# Patient Record
Sex: FEMALE | Race: WHITE | NOT HISPANIC OR LATINO | Employment: OTHER | URBAN - METROPOLITAN AREA
[De-identification: names, ages, dates, MRNs, and addresses within clinical notes are randomized per-mention and may not be internally consistent; named-entity substitution may affect disease eponyms.]

---

## 2017-06-07 ENCOUNTER — APPOINTMENT (OUTPATIENT)
Dept: GENERAL RADIOLOGY | Facility: HOSPITAL | Age: 72
End: 2017-06-07

## 2017-06-07 ENCOUNTER — OFFICE VISIT (OUTPATIENT)
Dept: CARDIOLOGY | Facility: CLINIC | Age: 72
End: 2017-06-07

## 2017-06-07 ENCOUNTER — HOSPITAL ENCOUNTER (INPATIENT)
Facility: HOSPITAL | Age: 72
LOS: 2 days | Discharge: HOME | End: 2017-06-11
Attending: INTERNAL MEDICINE | Admitting: INTERNAL MEDICINE

## 2017-06-07 VITALS
HEIGHT: 63 IN | HEART RATE: 90 BPM | DIASTOLIC BLOOD PRESSURE: 88 MMHG | BODY MASS INDEX: 26.93 KG/M2 | WEIGHT: 152 LBS | SYSTOLIC BLOOD PRESSURE: 158 MMHG

## 2017-06-07 DIAGNOSIS — I20.0 UNSTABLE ANGINA (HCC): ICD-10-CM

## 2017-06-07 DIAGNOSIS — I10 ESSENTIAL HYPERTENSION: ICD-10-CM

## 2017-06-07 DIAGNOSIS — I95.9 HYPOTENSION, UNSPECIFIED HYPOTENSION TYPE: Primary | ICD-10-CM

## 2017-06-07 DIAGNOSIS — I07.1 TRICUSPID VALVE INSUFFICIENCY, UNSPECIFIED ETIOLOGY: ICD-10-CM

## 2017-06-07 DIAGNOSIS — D64.9 ANEMIA, UNSPECIFIED TYPE: ICD-10-CM

## 2017-06-07 DIAGNOSIS — Z98.890 HISTORY OF HEART VALVE REPAIR: Primary | ICD-10-CM

## 2017-06-07 LAB
ALBUMIN SERPL-MCNC: 4.4 G/DL (ref 3.5–5.2)
ALBUMIN/GLOB SERPL: 1.4 G/DL
ALP SERPL-CCNC: 92 U/L (ref 39–117)
ALT SERPL W P-5'-P-CCNC: 16 U/L (ref 1–33)
ANION GAP SERPL CALCULATED.3IONS-SCNC: 16.9 MMOL/L
AST SERPL-CCNC: 24 U/L (ref 1–32)
BASOPHILS # BLD AUTO: 0.07 10*3/MM3 (ref 0–0.2)
BASOPHILS NFR BLD AUTO: 1.1 % (ref 0–1.5)
BILIRUB SERPL-MCNC: 0.3 MG/DL (ref 0.1–1.2)
BUN BLD-MCNC: 21 MG/DL (ref 8–23)
BUN/CREAT SERPL: 24.7 (ref 7–25)
CALCIUM SPEC-SCNC: 9.9 MG/DL (ref 8.6–10.5)
CHLORIDE SERPL-SCNC: 103 MMOL/L (ref 98–107)
CHOLEST SERPL-MCNC: 218 MG/DL (ref 0–200)
CO2 SERPL-SCNC: 20.1 MMOL/L (ref 22–29)
CREAT BLD-MCNC: 0.85 MG/DL (ref 0.57–1)
DEPRECATED RDW RBC AUTO: 46.5 FL (ref 37–54)
EOSINOPHIL # BLD AUTO: 0.2 10*3/MM3 (ref 0–0.7)
EOSINOPHIL NFR BLD AUTO: 3.1 % (ref 0.3–6.2)
ERYTHROCYTE [DISTWIDTH] IN BLOOD BY AUTOMATED COUNT: 16 % (ref 11.7–13)
GFR SERPL CREATININE-BSD FRML MDRD: 66 ML/MIN/1.73
GLOBULIN UR ELPH-MCNC: 3.1 GM/DL
GLUCOSE BLD-MCNC: 116 MG/DL (ref 65–99)
HCT VFR BLD AUTO: 36.6 % (ref 35.6–45.5)
HDLC SERPL-MCNC: 90 MG/DL (ref 40–60)
HGB BLD-MCNC: 10.9 G/DL (ref 11.9–15.5)
IMM GRANULOCYTES # BLD: 0.02 10*3/MM3 (ref 0–0.03)
IMM GRANULOCYTES NFR BLD: 0.3 % (ref 0–0.5)
INR PPP: 0.96 (ref 0.9–1.1)
LDLC SERPL CALC-MCNC: 101 MG/DL (ref 0–100)
LDLC/HDLC SERPL: 1.12 {RATIO}
LYMPHOCYTES # BLD AUTO: 1.54 10*3/MM3 (ref 0.9–4.8)
LYMPHOCYTES NFR BLD AUTO: 23.6 % (ref 19.6–45.3)
MAGNESIUM SERPL-MCNC: 2.4 MG/DL (ref 1.6–2.4)
MCH RBC QN AUTO: 23.6 PG (ref 26.9–32)
MCHC RBC AUTO-ENTMCNC: 29.8 G/DL (ref 32.4–36.3)
MCV RBC AUTO: 79.4 FL (ref 80.5–98.2)
MONOCYTES # BLD AUTO: 0.58 10*3/MM3 (ref 0.2–1.2)
MONOCYTES NFR BLD AUTO: 8.9 % (ref 5–12)
NEUTROPHILS # BLD AUTO: 4.12 10*3/MM3 (ref 1.9–8.1)
NEUTROPHILS NFR BLD AUTO: 63 % (ref 42.7–76)
NT-PROBNP SERPL-MCNC: 912.8 PG/ML (ref 0–900)
PLATELET # BLD AUTO: 368 10*3/MM3 (ref 140–500)
PMV BLD AUTO: 9.5 FL (ref 6–12)
POTASSIUM BLD-SCNC: 4.2 MMOL/L (ref 3.5–5.2)
PROT SERPL-MCNC: 7.5 G/DL (ref 6–8.5)
PROTHROMBIN TIME: 12.3 SECONDS (ref 11.7–14.2)
RBC # BLD AUTO: 4.61 10*6/MM3 (ref 3.9–5.2)
SODIUM BLD-SCNC: 140 MMOL/L (ref 136–145)
TRIGL SERPL-MCNC: 137 MG/DL (ref 0–150)
TROPONIN T SERPL-MCNC: <0.01 NG/ML (ref 0–0.03)
TROPONIN T SERPL-MCNC: <0.01 NG/ML (ref 0–0.03)
VLDLC SERPL-MCNC: 27.4 MG/DL (ref 5–40)
WBC NRBC COR # BLD: 6.53 10*3/MM3 (ref 4.5–10.7)

## 2017-06-07 PROCEDURE — 93000 ELECTROCARDIOGRAM COMPLETE: CPT | Performed by: INTERNAL MEDICINE

## 2017-06-07 PROCEDURE — 25010000002 ENOXAPARIN PER 10 MG: Performed by: NURSE PRACTITIONER

## 2017-06-07 PROCEDURE — 93005 ELECTROCARDIOGRAM TRACING: CPT | Performed by: NURSE PRACTITIONER

## 2017-06-07 PROCEDURE — 83735 ASSAY OF MAGNESIUM: CPT | Performed by: NURSE PRACTITIONER

## 2017-06-07 PROCEDURE — 83880 ASSAY OF NATRIURETIC PEPTIDE: CPT | Performed by: NURSE PRACTITIONER

## 2017-06-07 PROCEDURE — 71010 HC CHEST PA OR AP: CPT

## 2017-06-07 PROCEDURE — 84484 ASSAY OF TROPONIN QUANT: CPT | Performed by: NURSE PRACTITIONER

## 2017-06-07 PROCEDURE — 99214 OFFICE O/P EST MOD 30 MIN: CPT | Performed by: INTERNAL MEDICINE

## 2017-06-07 PROCEDURE — 93010 ELECTROCARDIOGRAM REPORT: CPT | Performed by: INTERNAL MEDICINE

## 2017-06-07 PROCEDURE — 85025 COMPLETE CBC W/AUTO DIFF WBC: CPT | Performed by: NURSE PRACTITIONER

## 2017-06-07 PROCEDURE — 85610 PROTHROMBIN TIME: CPT | Performed by: NURSE PRACTITIONER

## 2017-06-07 PROCEDURE — 80061 LIPID PANEL: CPT | Performed by: NURSE PRACTITIONER

## 2017-06-07 PROCEDURE — G0378 HOSPITAL OBSERVATION PER HR: HCPCS

## 2017-06-07 PROCEDURE — 80053 COMPREHEN METABOLIC PANEL: CPT | Performed by: NURSE PRACTITIONER

## 2017-06-07 RX ORDER — CYCLOBENZAPRINE HCL 10 MG
10 TABLET ORAL 3 TIMES DAILY PRN
Status: DISCONTINUED | OUTPATIENT
Start: 2017-06-07 | End: 2017-06-11 | Stop reason: HOSPADM

## 2017-06-07 RX ORDER — LEVOTHYROXINE SODIUM 112 UG/1
112 TABLET ORAL
Status: DISCONTINUED | OUTPATIENT
Start: 2017-06-08 | End: 2017-06-08

## 2017-06-07 RX ORDER — POTASSIUM CHLORIDE 1.5 G/1.77G
20 POWDER, FOR SOLUTION ORAL 2 TIMES DAILY
Status: DISCONTINUED | OUTPATIENT
Start: 2017-06-07 | End: 2017-06-07 | Stop reason: CLARIF

## 2017-06-07 RX ORDER — MELOXICAM 15 MG/1
15 TABLET ORAL DAILY PRN
Status: DISCONTINUED | OUTPATIENT
Start: 2017-06-07 | End: 2017-06-11 | Stop reason: HOSPADM

## 2017-06-07 RX ORDER — ACETAMINOPHEN 325 MG/1
650 TABLET ORAL EVERY 4 HOURS PRN
Status: CANCELLED | OUTPATIENT
Start: 2017-06-07

## 2017-06-07 RX ORDER — ACETAMINOPHEN AND CODEINE PHOSPHATE 300; 30 MG/1; MG/1
1 TABLET ORAL EVERY 6 HOURS PRN
Status: DISCONTINUED | OUTPATIENT
Start: 2017-06-07 | End: 2017-06-11 | Stop reason: HOSPADM

## 2017-06-07 RX ORDER — ACETAMINOPHEN 325 MG/1
650 TABLET ORAL EVERY 4 HOURS PRN
Status: DISCONTINUED | OUTPATIENT
Start: 2017-06-07 | End: 2017-06-11 | Stop reason: HOSPADM

## 2017-06-07 RX ORDER — POTASSIUM CHLORIDE 750 MG/1
20 CAPSULE, EXTENDED RELEASE ORAL 2 TIMES DAILY WITH MEALS
Status: DISCONTINUED | OUTPATIENT
Start: 2017-06-07 | End: 2017-06-11 | Stop reason: HOSPADM

## 2017-06-07 RX ORDER — CETIRIZINE HYDROCHLORIDE 10 MG/1
5 TABLET ORAL DAILY
Status: DISCONTINUED | OUTPATIENT
Start: 2017-06-07 | End: 2017-06-07

## 2017-06-07 RX ORDER — PANTOPRAZOLE SODIUM 40 MG/1
40 TABLET, DELAYED RELEASE ORAL
Status: DISCONTINUED | OUTPATIENT
Start: 2017-06-07 | End: 2017-06-11 | Stop reason: HOSPADM

## 2017-06-07 RX ORDER — PANTOPRAZOLE SODIUM 40 MG/1
40 TABLET, DELAYED RELEASE ORAL
Status: CANCELLED | OUTPATIENT
Start: 2017-06-07

## 2017-06-07 RX ORDER — PANTOPRAZOLE SODIUM 40 MG/1
40 TABLET, DELAYED RELEASE ORAL
Status: DISCONTINUED | OUTPATIENT
Start: 2017-06-07 | End: 2017-06-07

## 2017-06-07 RX ORDER — OXYBUTYNIN CHLORIDE 10 MG/1
10 TABLET, EXTENDED RELEASE ORAL DAILY
Status: DISCONTINUED | OUTPATIENT
Start: 2017-06-07 | End: 2017-06-07

## 2017-06-07 RX ORDER — POTASSIUM CHLORIDE 1.5 G/1.77G
20 POWDER, FOR SOLUTION ORAL 2 TIMES DAILY
COMMUNITY

## 2017-06-07 RX ORDER — ASPIRIN 81 MG/1
81 TABLET ORAL DAILY
Status: DISCONTINUED | OUTPATIENT
Start: 2017-06-07 | End: 2017-06-11 | Stop reason: HOSPADM

## 2017-06-07 RX ORDER — CYCLOBENZAPRINE HCL 10 MG
10 TABLET ORAL 3 TIMES DAILY PRN
COMMUNITY

## 2017-06-07 RX ORDER — ESTRADIOL 1 MG/1
1 TABLET ORAL DAILY
Status: DISCONTINUED | OUTPATIENT
Start: 2017-06-07 | End: 2017-06-11 | Stop reason: HOSPADM

## 2017-06-07 RX ORDER — OXYBUTYNIN CHLORIDE 10 MG/1
10 TABLET, EXTENDED RELEASE ORAL DAILY
COMMUNITY

## 2017-06-07 RX ORDER — SODIUM CHLORIDE 0.9 % (FLUSH) 0.9 %
1-10 SYRINGE (ML) INJECTION AS NEEDED
Status: CANCELLED | OUTPATIENT
Start: 2017-06-07

## 2017-06-07 RX ORDER — MELOXICAM 15 MG/1
15 TABLET ORAL DAILY
COMMUNITY
End: 2017-06-11 | Stop reason: HOSPADM

## 2017-06-07 RX ORDER — HYDRALAZINE HYDROCHLORIDE 50 MG/1
50 TABLET, FILM COATED ORAL 2 TIMES DAILY
Status: DISCONTINUED | OUTPATIENT
Start: 2017-06-07 | End: 2017-06-08

## 2017-06-07 RX ORDER — ACETAMINOPHEN AND CODEINE PHOSPHATE 300; 30 MG/1; MG/1
1 TABLET ORAL EVERY 6 HOURS PRN
COMMUNITY

## 2017-06-07 RX ORDER — SOLIFENACIN SUCCINATE 5 MG/1
5 TABLET, FILM COATED ORAL DAILY
Status: DISCONTINUED | OUTPATIENT
Start: 2017-06-07 | End: 2017-06-07 | Stop reason: CLARIF

## 2017-06-07 RX ORDER — SODIUM CHLORIDE 0.9 % (FLUSH) 0.9 %
1-10 SYRINGE (ML) INJECTION AS NEEDED
Status: DISCONTINUED | OUTPATIENT
Start: 2017-06-07 | End: 2017-06-11 | Stop reason: HOSPADM

## 2017-06-07 RX ORDER — ATORVASTATIN CALCIUM 10 MG/1
10 TABLET, FILM COATED ORAL DAILY
Status: DISCONTINUED | OUTPATIENT
Start: 2017-06-07 | End: 2017-06-08

## 2017-06-07 RX ADMIN — POTASSIUM CHLORIDE 20 MEQ: 750 CAPSULE, EXTENDED RELEASE ORAL at 17:07

## 2017-06-07 RX ADMIN — ACETAMINOPHEN AND CODEINE PHOSPHATE 1 TABLET: 30; 300 TABLET ORAL at 13:21

## 2017-06-07 RX ADMIN — ATORVASTATIN CALCIUM 10 MG: 10 TABLET, FILM COATED ORAL at 20:48

## 2017-06-07 RX ADMIN — PANTOPRAZOLE SODIUM 40 MG: 40 TABLET, DELAYED RELEASE ORAL at 18:22

## 2017-06-07 RX ADMIN — MELOXICAM 15 MG: 15 TABLET ORAL at 23:58

## 2017-06-07 RX ADMIN — NITROGLYCERIN 0.5 INCH: 20 OINTMENT TOPICAL at 13:21

## 2017-06-07 RX ADMIN — ACETAMINOPHEN AND CODEINE PHOSPHATE 1 TABLET: 30; 300 TABLET ORAL at 20:48

## 2017-06-07 RX ADMIN — NITROGLYCERIN 0.5 INCH: 20 OINTMENT TOPICAL at 20:40

## 2017-06-07 RX ADMIN — HYDRALAZINE HYDROCHLORIDE 50 MG: 50 TABLET, FILM COATED ORAL at 17:07

## 2017-06-07 RX ADMIN — ENOXAPARIN SODIUM 40 MG: 40 INJECTION SUBCUTANEOUS at 13:21

## 2017-06-07 NOTE — CONSULTS
MEDICINE CONSULTATION    CONSULTING PHYSICIAN: West Perkins MD    REQUESTING PHYSICIAN: Cathy Hua MD    REASON FOR CONSULTATION: Follow medical problems.     HISTORY OF PRESENT ILLNESS: This 71-year-old white female with known coronary artery disease, other medical problems, hypertension, hyperlipidemia, mitral valve repair, degenerative disk disease, pulmonary hypertension, hypothyroidism, gastroesophageal reflux disease, directly admitted to Cardiology service with substernal chest pain that she woke up with, with some shortness of breath. No nausea, vomiting, diaphoresis. No radiation. Lasted a few minutes and relieved by itself. She has known coronary artery disease. Admitted for further workup; at the time of interview she is pain-free. No fever or chills. No night sweats. No other complaint.    PAST MEDICAL HISTORY:  1.  Coronary artery disease.   2.  Hypertension.  3.  Hyperlipidemia.  4.  Valvular heart disease, status post mitral valve repair.  5.  Degenerative disk disease.  6.  Hypothyroidism.  7.  Gastroesophageal reflux disease.     PAST SURGICAL HISTORY: Status post cardiac surgery in the past.     SOCIAL HISTORY: No recent tobacco, alcohol or drug abuse.    FAMILY HISTORY: Noncontributory.    ALLERGIES:  1.  LISINOPRIL.  2.  LOSARTAN.     PHYSICAL EXAMINATION:  GENERAL: Alert, oriented, no distress.   VITAL SIGNS: Afebrile. Pulse 67, respirations 18, blood pressure 151/83, O2 saturations 95%.  HEENT: Unremarkable.  NECK: Supple.  LUNGS: Clear.  HEART: S1, S2.   ABDOMEN: Soft, nontender. Bowel sounds positive.  EXTREMITIES: No edema.  NEUROLOGIC: No focal deficit.     DIAGNOSTIC DATA: Potassium 4.2, glucose 116, . Hemoglobin 10.9. The rest of the CBC is normal. Chest x-ray showed no active disease. EKG showed sinus rhythm, LVH, nonspecific changes.     ASSESSMENT:  1.  Chest pain with known coronary artery disease, consistent with stable angina.   2.  Hypertension.  3.   Hyperlipidemia.  4.  Hypothyroidism.  5.  Gastroesophageal reflux disease.   6.  Degenerative disk disease.     RECOMMENDATIONS: Agree with current care. Will adjust the medication. Will keep her on stress ulcer and DVT prophylaxis. Will check a TSH, hemoglobin A1c. Will follow with Dr. Hua. Further recommendations according to hospital course.

## 2017-06-07 NOTE — PROGRESS NOTES
Subjective:       Leslie Barber is a 71 y.o. female who here for follow up    CC  CP OFF AND ON FOR COUPLE MONTHS  HPI  71-year-old white female with a status post aortic while replacement here for the follow-up as the patient has been complaining of increasing shortness of breath and chest tightness off-and-on over the several months with weakness and tiredness but no syncopal near-syncopal episode    Patient also has history of tricuspid valve insufficiency as well as benign essential arterial hypertension      Problem List Items Addressed This Visit        Cardiovascular and Mediastinum    Hypertension    Relevant Medications    metoprolol tartrate (LOPRESSOR) 25 MG tablet    Tricuspid valve insufficiency    Relevant Medications    metoprolol tartrate (LOPRESSOR) 25 MG tablet    Unstable angina    Relevant Medications    metoprolol tartrate (LOPRESSOR) 25 MG tablet       Other    History of heart valve repair - Primary        .    The following portions of the patient's history were reviewed and updated as appropriate: allergies, current medications, past family history, past medical history, past social history, past surgical history and problem list.    Past Medical History:   Diagnosis Date   • Cataract    • Chest pain    • Coronary artery disease     CHRONIC   • GERD (gastroesophageal reflux disease)    • Hyperlipidemia    • Hypertension    • Mitral valve disease    • Myocardial infarction    • Near syncope    • Pain in extremity     reports that she has never smoked. She has never used smokeless tobacco. She reports that she does not drink alcohol or use illicit drugs.  Family History   Problem Relation Age of Onset   • Hypertension Mother    • Heart disease Mother    • Hypertension Father    • Heart disease Father        Review of Systems  Constitutional: No wt loss, fever, fatigue  Gastrointestinal: No nausea, abdominal pain  Behavioral/Psych: No insomnia or anxiety   Cardiovascular Shortness of  "breath and chest tightness  Objective:       Physical Exam           Physical Exam  /88  Pulse 90  Ht 63\" (160 cm)  Wt 152 lb (68.9 kg)  BMI 26.93 kg/m2    General appearance: NAD, conversant   Eyes: anicteric sclerae, moist conjunctivae; no lid-lag; PERRLA   HENT: Atraumatic; oropharynx clear with moist mucous membranes and no mucosal ulcerations;  normal hard and soft palate   Neck: Trachea midline; FROM, supple, no thyromegaly or lymphadenopathy   Lungs: CTA, with normal respiratory effort and no intercostal retractions   CV: S1-S2 regular, sys murmurs, no rub, no gallop   Abdomen: Soft, non-tender; no masses or HSM   Extremities: No peripheral edema or extremity lymphadenopathy  Skin: Normal temperature, turgor and texture; no rash, ulcers or subcutaneous nodules   Psych: Appropriate affect, alert and oriented to person, place and time           Cardiographics  @  ECG 12 Lead  Date/Time: 6/7/2017 11:02 AM  Performed by: LEANDER HOUSTON  Authorized by: LEANDER HOUSTON   Comparison: compared with previous ECG   Similar to previous ECG  Rhythm: sinus rhythm  ST Flattening: all  Clinical impression: non-specific ECG        IMPRESSION OF HEART CATHETERIZATION:    1. Normal left main coronary artery.  2. Normal left anterior descending artery and its branches.  3. Normal left circumflex artery and its branches.  4. Normal right coronary artery.  5. Normal left ventricular systolic function.  LVEDP 25 mmHg.  Ejection   fraction 50%  6. Moderate pulmonary hypertension.  7. 3+ mitral regurgitation    Echocardiogram:        Current Outpatient Prescriptions:   •  aspirin 81 MG tablet, Take 81 mg by mouth Daily., Disp: , Rfl:   •  cetirizine (ZyrTEC) 5 MG tablet, Take  by mouth., Disp: , Rfl:   •  cyclobenzaprine (FLEXERIL) 10 MG tablet, Take 10 mg by mouth 3 (Three) Times a Day As Needed for Muscle Spasms., Disp: , Rfl:   •  estradiol (ESTRACE) 1 MG tablet, TAKE ONE TABLET BY MOUTH EVERY DAY, Disp: 90 " tablet, Rfl: 0  •  hydrALAZINE (APRESOLINE) 50 MG tablet, Take 50 mg by mouth 3 (Three) Times a Day., Disp: , Rfl:   •  levothyroxine (SYNTHROID, LEVOTHROID) 112 MCG tablet, Take 112 mcg by mouth Daily., Disp: , Rfl:   •  meloxicam (MOBIC) 15 MG tablet, Take 15 mg by mouth Daily., Disp: , Rfl:   •  metoprolol tartrate (LOPRESSOR) 25 MG tablet, Take 25 mg by mouth 2 (Two) Times a Day., Disp: , Rfl:   •  oxybutynin XL (DITROPAN-XL) 10 MG 24 hr tablet, Take 10 mg by mouth Daily., Disp: , Rfl:   •  pantoprazole (PROTONIX) 40 MG EC tablet, Take 40 mg by mouth Daily., Disp: , Rfl:   •  potassium chloride (KLOR-CON) 20 MEQ packet, Take 20 mEq by mouth 2 (Two) Times a Day., Disp: , Rfl:   •  simvastatin (ZOCOR) 20 MG tablet, Take  by mouth., Disp: , Rfl:   •  Solifenacin Succinate (VESICARE PO), Take  by mouth., Disp: , Rfl:    Assessment:        Patient Active Problem List   Diagnosis   • Abnormal thallium stress test   • Benign essential hypertension   • Chronic coronary artery disease   • Chest pain   • Degeneration of intervertebral disc of lumbar region   • Pain in extremity   • Low back pain   • Mitral valve insufficiency   • Advance directive discussed with patient   • Atrophic vaginitis   • Chronic back pain   • Malignant neoplastic disease   • Coagulation disorder   • Detrusor instability of bladder   • Stress incontinence in female   • Hyperlipidemia   • Hypertension   • Hypothyroidism   • Arthropathy of lumbar facet joint   • Muscular atrophy   • Complication of surgical procedure   • History of heart valve repair   • Spondylolisthesis   • Tricuspid valve insufficiency   • Ulcer of heel   • Urinary urgency   • Enterocele   • Near syncope   • Spinal stenosis of cervical region   • Arthritis               Plan:            ICD-10-CM ICD-9-CM   1. History of heart valve repair Z98.890 V45.89   2. Unstable angina I20.0 411.1   3. Essential hypertension I10 401.9   4. Tricuspid valve insufficiency, unspecified etiology  I07.1 397.0     1. History of heart valve repair  Up is to be functioning well    2. Unstable angina  Will had to admit to the hospital with a repeat EKGs cardiac enzymes and diagnostic heart catheter versus stress test    3. Essential hypertension  Somewhat high    4. Tricuspid valve insufficiency, unspecified etiology  Continue to treat medically, may need a repair         ADMIT TO HOSP      COUNSELING:    Leslie Anguiano was given to patient for the following topics: diagnostic results, risk factor reductions, impressions, risks and benefits of treatment options and importance of treatment compliance .       SMOKING COUNSELING:    Counseling given: Not Answered      EMR Dragon/Transcription disclaimer:   Much of this encounter note is an electronic transcription/translation of spoken language to printed text. The electronic translation of spoken language may permit erroneous, or at times, nonsensical words or phrases to be inadvertently transcribed; Although I have reviewed the note for such errors, some may still exist.

## 2017-06-07 NOTE — PLAN OF CARE
Problem: Patient Care Overview (Adult)  Goal: Plan of Care Review  Outcome: Ongoing (interventions implemented as appropriate)    06/07/17 1426   Coping/Psychosocial Response Interventions   Plan Of Care Reviewed With patient   Patient Care Overview   Progress no change   Outcome Evaluation   Outcome Summary/Follow up Plan Pt admitted. Admission done. VSS. No c/o at this time. Plan for a stress test tomorrow. Will conintinue to monitor        Goal: Adult Individualization and Mutuality  Outcome: Ongoing (interventions implemented as appropriate)  Goal: Discharge Needs Assessment  Outcome: Ongoing (interventions implemented as appropriate)    Problem: Pain, Acute (Adult)  Goal: Identify Related Risk Factors and Signs and Symptoms  Outcome: Ongoing (interventions implemented as appropriate)

## 2017-06-08 ENCOUNTER — APPOINTMENT (OUTPATIENT)
Dept: CARDIOLOGY | Facility: HOSPITAL | Age: 72
End: 2017-06-08

## 2017-06-08 PROBLEM — I95.9 HYPOTENSION: Status: ACTIVE | Noted: 2017-06-08

## 2017-06-08 LAB
ALBUMIN SERPL-MCNC: 3.6 G/DL (ref 3.5–5.2)
ALBUMIN/GLOB SERPL: 1.4 G/DL
ALP SERPL-CCNC: 67 U/L (ref 39–117)
ALT SERPL W P-5'-P-CCNC: 10 U/L (ref 1–33)
ANION GAP SERPL CALCULATED.3IONS-SCNC: 12.3 MMOL/L
AST SERPL-CCNC: 16 U/L (ref 1–32)
BASOPHILS # BLD AUTO: 0.08 10*3/MM3 (ref 0–0.2)
BASOPHILS NFR BLD AUTO: 1.3 % (ref 0–1.5)
BH CV STRESS BP STAGE 1: NORMAL
BH CV STRESS COMMENTS STAGE 1: NORMAL
BH CV STRESS DOSE REGADENOSON STAGE 1: 0.4
BH CV STRESS DURATION MIN STAGE 1: 0
BH CV STRESS DURATION SEC STAGE 1: 15
BH CV STRESS HR STAGE 1: 88
BH CV STRESS PROTOCOL 1: NORMAL
BH CV STRESS RECOVERY BP: NORMAL MMHG
BH CV STRESS RECOVERY HR: 77 BPM
BH CV STRESS STAGE 1: 1
BILIRUB SERPL-MCNC: 0.2 MG/DL (ref 0.1–1.2)
BUN BLD-MCNC: 19 MG/DL (ref 8–23)
BUN/CREAT SERPL: 23.5 (ref 7–25)
CALCIUM SPEC-SCNC: 8.7 MG/DL (ref 8.6–10.5)
CHLORIDE SERPL-SCNC: 104 MMOL/L (ref 98–107)
CO2 SERPL-SCNC: 21.7 MMOL/L (ref 22–29)
CREAT BLD-MCNC: 0.81 MG/DL (ref 0.57–1)
DEPRECATED RDW RBC AUTO: 46.7 FL (ref 37–54)
EOSINOPHIL # BLD AUTO: 0.25 10*3/MM3 (ref 0–0.7)
EOSINOPHIL NFR BLD AUTO: 4 % (ref 0.3–6.2)
ERYTHROCYTE [DISTWIDTH] IN BLOOD BY AUTOMATED COUNT: 16.1 % (ref 11.7–13)
GFR SERPL CREATININE-BSD FRML MDRD: 69 ML/MIN/1.73
GLOBULIN UR ELPH-MCNC: 2.6 GM/DL
GLUCOSE BLD-MCNC: 102 MG/DL (ref 65–99)
HBA1C MFR BLD: 4.97 % (ref 4.8–5.6)
HCT VFR BLD AUTO: 29.8 % (ref 35.6–45.5)
HGB BLD-MCNC: 8.9 G/DL (ref 11.9–15.5)
IMM GRANULOCYTES # BLD: 0 10*3/MM3 (ref 0–0.03)
IMM GRANULOCYTES NFR BLD: 0 % (ref 0–0.5)
LV EF NUC BP: 50 %
LYMPHOCYTES # BLD AUTO: 1.84 10*3/MM3 (ref 0.9–4.8)
LYMPHOCYTES NFR BLD AUTO: 29.4 % (ref 19.6–45.3)
MAXIMAL PREDICTED HEART RATE: 147 BPM
MCH RBC QN AUTO: 23.5 PG (ref 26.9–32)
MCHC RBC AUTO-ENTMCNC: 29.9 G/DL (ref 32.4–36.3)
MCV RBC AUTO: 78.8 FL (ref 80.5–98.2)
MONOCYTES # BLD AUTO: 0.63 10*3/MM3 (ref 0.2–1.2)
MONOCYTES NFR BLD AUTO: 10.1 % (ref 5–12)
NEUTROPHILS # BLD AUTO: 3.46 10*3/MM3 (ref 1.9–8.1)
NEUTROPHILS NFR BLD AUTO: 55.2 % (ref 42.7–76)
NT-PROBNP SERPL-MCNC: 578.9 PG/ML (ref 0–900)
PERCENT MAX PREDICTED HR: 59.86 %
PLATELET # BLD AUTO: 304 10*3/MM3 (ref 140–500)
PMV BLD AUTO: 9.5 FL (ref 6–12)
POTASSIUM BLD-SCNC: 4.1 MMOL/L (ref 3.5–5.2)
PROT SERPL-MCNC: 6.2 G/DL (ref 6–8.5)
RBC # BLD AUTO: 3.78 10*6/MM3 (ref 3.9–5.2)
SODIUM BLD-SCNC: 138 MMOL/L (ref 136–145)
STRESS BASELINE BP: NORMAL MMHG
STRESS BASELINE HR: 74 BPM
STRESS PERCENT HR: 70 %
STRESS POST ESTIMATED WORKLOAD: 1.8 METS
STRESS POST EXERCISE DUR MIN: 1 MIN
STRESS POST EXERCISE DUR SEC: 0 SEC
STRESS POST PEAK BP: NORMAL MMHG
STRESS POST PEAK HR: 88 BPM
STRESS TARGET HR: 125 BPM
T3FREE SERPL-MCNC: 2.03 PG/ML (ref 2–4.4)
T4 FREE SERPL-MCNC: 1.11 NG/DL (ref 0.93–1.7)
TROPONIN T SERPL-MCNC: <0.01 NG/ML (ref 0–0.03)
TSH SERPL DL<=0.05 MIU/L-ACNC: 21.17 MIU/ML (ref 0.27–4.2)
WBC NRBC COR # BLD: 6.26 10*3/MM3 (ref 4.5–10.7)

## 2017-06-08 PROCEDURE — G0378 HOSPITAL OBSERVATION PER HR: HCPCS

## 2017-06-08 PROCEDURE — 0 TECHNETIUM SESTAMIBI: Performed by: INTERNAL MEDICINE

## 2017-06-08 PROCEDURE — 84481 FREE ASSAY (FT-3): CPT | Performed by: HOSPITALIST

## 2017-06-08 PROCEDURE — 93005 ELECTROCARDIOGRAM TRACING: CPT | Performed by: NURSE PRACTITIONER

## 2017-06-08 PROCEDURE — 25010000002 ENOXAPARIN PER 10 MG: Performed by: NURSE PRACTITIONER

## 2017-06-08 PROCEDURE — 93016 CV STRESS TEST SUPVJ ONLY: CPT | Performed by: INTERNAL MEDICINE

## 2017-06-08 PROCEDURE — 99225 PR SBSQ OBSERVATION CARE/DAY 25 MINUTES: CPT | Performed by: INTERNAL MEDICINE

## 2017-06-08 PROCEDURE — 84439 ASSAY OF FREE THYROXINE: CPT | Performed by: HOSPITALIST

## 2017-06-08 PROCEDURE — 83036 HEMOGLOBIN GLYCOSYLATED A1C: CPT | Performed by: HOSPITALIST

## 2017-06-08 PROCEDURE — 25010000002 AMINOPHYLLINE PER 250 MG: Performed by: INTERNAL MEDICINE

## 2017-06-08 PROCEDURE — 80053 COMPREHEN METABOLIC PANEL: CPT | Performed by: HOSPITALIST

## 2017-06-08 PROCEDURE — 93010 ELECTROCARDIOGRAM REPORT: CPT | Performed by: INTERNAL MEDICINE

## 2017-06-08 PROCEDURE — 83880 ASSAY OF NATRIURETIC PEPTIDE: CPT | Performed by: HOSPITALIST

## 2017-06-08 PROCEDURE — 78452 HT MUSCLE IMAGE SPECT MULT: CPT

## 2017-06-08 PROCEDURE — 93018 CV STRESS TEST I&R ONLY: CPT | Performed by: INTERNAL MEDICINE

## 2017-06-08 PROCEDURE — 93017 CV STRESS TEST TRACING ONLY: CPT

## 2017-06-08 PROCEDURE — 78452 HT MUSCLE IMAGE SPECT MULT: CPT | Performed by: INTERNAL MEDICINE

## 2017-06-08 PROCEDURE — A9500 TC99M SESTAMIBI: HCPCS | Performed by: INTERNAL MEDICINE

## 2017-06-08 PROCEDURE — 84443 ASSAY THYROID STIM HORMONE: CPT | Performed by: HOSPITALIST

## 2017-06-08 PROCEDURE — 25010000002 REGADENOSON 0.4 MG/5ML SOLUTION: Performed by: INTERNAL MEDICINE

## 2017-06-08 PROCEDURE — 85025 COMPLETE CBC W/AUTO DIFF WBC: CPT | Performed by: HOSPITALIST

## 2017-06-08 RX ORDER — LEVOTHYROXINE SODIUM 0.15 MG/1
150 TABLET ORAL
Status: DISCONTINUED | OUTPATIENT
Start: 2017-06-09 | End: 2017-06-11 | Stop reason: HOSPADM

## 2017-06-08 RX ORDER — ATORVASTATIN CALCIUM 20 MG/1
20 TABLET, FILM COATED ORAL NIGHTLY
Status: DISCONTINUED | OUTPATIENT
Start: 2017-06-08 | End: 2017-06-08

## 2017-06-08 RX ORDER — ATORVASTATIN CALCIUM 10 MG/1
10 TABLET, FILM COATED ORAL NIGHTLY
Status: DISCONTINUED | OUTPATIENT
Start: 2017-06-08 | End: 2017-06-11 | Stop reason: HOSPADM

## 2017-06-08 RX ORDER — AMINOPHYLLINE DIHYDRATE 25 MG/ML
125 INJECTION, SOLUTION INTRAVENOUS ONCE
Status: COMPLETED | OUTPATIENT
Start: 2017-06-08 | End: 2017-06-08

## 2017-06-08 RX ORDER — HYDRALAZINE HYDROCHLORIDE 50 MG/1
50 TABLET, FILM COATED ORAL EVERY 8 HOURS SCHEDULED
Status: DISCONTINUED | OUTPATIENT
Start: 2017-06-08 | End: 2017-06-11 | Stop reason: HOSPADM

## 2017-06-08 RX ADMIN — PANTOPRAZOLE SODIUM 40 MG: 40 TABLET, DELAYED RELEASE ORAL at 17:18

## 2017-06-08 RX ADMIN — Medication 1 DOSE: at 07:59

## 2017-06-08 RX ADMIN — ESTRADIOL 1 MG: 1 TABLET ORAL at 11:22

## 2017-06-08 RX ADMIN — NITROGLYCERIN 0.5 INCH: 20 OINTMENT TOPICAL at 07:00

## 2017-06-08 RX ADMIN — ASPIRIN 81 MG: 81 TABLET ORAL at 11:21

## 2017-06-08 RX ADMIN — ENOXAPARIN SODIUM 40 MG: 40 INJECTION SUBCUTANEOUS at 11:21

## 2017-06-08 RX ADMIN — NITROGLYCERIN 0.5 INCH: 20 OINTMENT TOPICAL at 21:50

## 2017-06-08 RX ADMIN — ATORVASTATIN CALCIUM 10 MG: 10 TABLET, FILM COATED ORAL at 21:48

## 2017-06-08 RX ADMIN — AMINOPHYLLINE 125 MG: 25 INJECTION, SOLUTION INTRAVENOUS at 10:49

## 2017-06-08 RX ADMIN — HYDRALAZINE HYDROCHLORIDE 50 MG: 50 TABLET, FILM COATED ORAL at 07:36

## 2017-06-08 RX ADMIN — LEVOTHYROXINE SODIUM 112 MCG: 112 TABLET ORAL at 06:47

## 2017-06-08 RX ADMIN — Medication 1 DOSE: at 09:37

## 2017-06-08 RX ADMIN — HYDRALAZINE HYDROCHLORIDE 50 MG: 50 TABLET, FILM COATED ORAL at 21:47

## 2017-06-08 RX ADMIN — REGADENOSON 0.4 MG: 0.08 INJECTION, SOLUTION INTRAVENOUS at 09:37

## 2017-06-08 RX ADMIN — ACETAMINOPHEN 650 MG: 325 TABLET ORAL at 11:29

## 2017-06-08 RX ADMIN — NITROGLYCERIN 0.5 INCH: 20 OINTMENT TOPICAL at 14:15

## 2017-06-08 RX ADMIN — POTASSIUM CHLORIDE 20 MEQ: 750 CAPSULE, EXTENDED RELEASE ORAL at 11:21

## 2017-06-08 RX ADMIN — POTASSIUM CHLORIDE 20 MEQ: 750 CAPSULE, EXTENDED RELEASE ORAL at 17:18

## 2017-06-08 RX ADMIN — METOPROLOL TARTRATE 25 MG: 25 TABLET ORAL at 07:37

## 2017-06-08 RX ADMIN — PANTOPRAZOLE SODIUM 40 MG: 40 TABLET, DELAYED RELEASE ORAL at 11:22

## 2017-06-08 NOTE — PLAN OF CARE
Problem: Patient Care Overview (Adult)  Goal: Plan of Care Review  Outcome: Ongoing (interventions implemented as appropriate)    06/08/17 0550   Coping/Psychosocial Response Interventions   Plan Of Care Reviewed With patient   Patient Care Overview   Progress no change   Outcome Evaluation   Outcome Summary/Follow up Plan Pt c/o chest pain some relieve with nitro patch and tylenol. BP was elevated. Pt was little anxious about stress test. Cont to monitor, safety maintained.          Problem: Pain, Acute (Adult)  Goal: Identify Related Risk Factors and Signs and Symptoms  Outcome: Ongoing (interventions implemented as appropriate)  Goal: Acceptable Pain Control/Comfort Level  Outcome: Ongoing (interventions implemented as appropriate)

## 2017-06-08 NOTE — CONSULTS
"Adult Nutrition  Assessment    Patient Name:  Leslie Barber  YOB: 1945  MRN: [id number]  Admit Date:  6/7/2017    Assessment Date:  6/8/2017          Reason for Assessment       06/08/17 1312    Reason for Assessment    Reason For Assessment/Visit nurse/nurse practitioner consult    Identified At Risk By Screening Criteria unintentional loss of 10 lbs or more in the past 2 mos;other (see comments)   MST SCORE 1    Diagnosis Diagnosis    Cardiac CAD;Dyslipidemia;HTN;MVR;Other (comment)   unstable angina                  Anthropometrics       06/08/17 1313    Body Mass Index (BMI)    BMI Grade 25 - 29.9 - overweight            Labs/Tests/Procedures/Meds       06/08/17 1313    Labs/Tests/Procedures/Meds    Diagnostic Test/Procedure Review reviewed    Labs/Tests Review Reviewed;Glucose;Hgb Hct    Medication Review Reviewed, pertinent;Antacid;Other (comment)   low dose ASA    Significant Vitals reviewed            Physical Findings       06/08/17 1314    Physical Findings/Assessment    Additional Documentation Physical Appearance (Group)    Physical Appearance    Skin other (see comments)   intact with bruises and scars; Haroldo score 20            Estimated/Assessed Needs       06/08/17 1314    Calculation Measurements    Weight Used For Calculations 68.9 kg (151 lb 14.4 oz)    Height Used for Calculations 1.6 m (5' 2.99\")    Estimated/Assessed Energy Needs    Energy Need Method [place] Jeor    Age 71    RMR ([place] Jeor Equation) 1163    Activity Factors ([place] Jeor)  Out of bed, ambulatory  1.3    Total estimated needs ([place] Jeor) 1512    Estimated/Assessed Protein Needs    Weight Used for Protein Calculation 68.9 kg (151 lb 14.4 oz)    Protein (gm/kg) 1.0    1.0 Gm Protein (gm) 68.9    Estimated Protein Range 5569    Estimated/Assessed Fluid Needs    Fluid Need Method RDA method    RDA Method (mL)  1512            Nutrition Prescription Ordered       06/08/17 1315    Nutrition " Prescription PO    Current PO Diet Regular    Common Modifiers Cardiac            Evaluation of Received Nutrient/Fluid Intake       06/08/17 1316    PO Evaluation    Number of Days PO Intake Evaluated Insufficient Data   no intake recorded            Comments:  Completed nutrition assessment triggered by nurse admission screen.        Electronically signed by:  Sofía Ryan RD  06/08/17 1:18 PM

## 2017-06-08 NOTE — PROGRESS NOTES
" DAILY PROGRESS NOTE    CHIEF COMPLAINTS  SOB DURING STRESS TEST  FEEL OK NOW    HISTORY OF PRESENT ILLNESS: This 71-year-old white female with known coronary artery disease, other medical problems, hypertension, hyperlipidemia, mitral valve repair, degenerative disk disease, pulmonary hypertension, hypothyroidism, gastroesophageal reflux disease, directly admitted to Cardiology service with substernal chest pain that she woke up with, with some shortness of breath. No nausea, vomiting, diaphoresis. No radiation. Lasted a few minutes and relieved by itself. She has known coronary artery disease. Admitted for further workup; at the time of interview she is pain-free. No fever or chills. No night sweats. No other complaint.    PHYSICAL EXAMINATION:Blood pressure 164/79, pulse 76, temperature 98.1 °F (36.7 °C), temperature source Oral, resp. rate 16, height 63\" (160 cm), weight 152 lb (68.9 kg), SpO2 97 %.    GENERAL: Alert, oriented, no distress.   HEENT: Unremarkable.  NECK: Supple.  LUNGS: Clear.  HEART: S1, S2.   ABDOMEN: Soft, nontender. Bowel sounds positive.  EXTREMITIES: No edema.  NEUROLOGIC: No focal deficit.     DIAGNOSTIC DATA:  Lab Results (last 24 hours)     Procedure Component Value Units Date/Time    Troponin [[id number]]  (Normal) Collected:  06/07/17 1807    Specimen:  Blood Updated:  06/07/17 1848     Troponin T <0.010 ng/mL     Narrative:       Troponin T Reference Ranges:  Less than 0.03 ng/mL:    Negative for AMI  0.03 to 0.09 ng/mL:      Indeterminant for AMI  Greater than 0.09 ng/mL: Positive for AMI    Troponin [[id number]]  (Normal) Collected:  06/07/17 2356    Specimen:  Blood Updated:  06/08/17 0028     Troponin T <0.010 ng/mL     Narrative:       Troponin T Reference Ranges:  Less than 0.03 ng/mL:    Negative for AMI  0.03 to 0.09 ng/mL:      Indeterminant for AMI  Greater than 0.09 ng/mL: Positive for AMI    Hemoglobin A1c [[id number]]  (Normal) Collected:  06/08/17 0516    Specimen:  Blood " Updated:  06/08/17 0551     Hemoglobin A1C 4.97 %     Narrative:       Hemoglobin A1C Ranges:    Increased Risk for Diabetes  5.7% to 6.4%  Diabetes                     >= 6.5%  Diabetic Goal                < 7.0%    CBC & Differential [[id number]] Collected:  06/08/17 0516    Specimen:  Blood Updated:  06/08/17 0554    Narrative:       The following orders were created for panel order CBC & Differential.  Procedure                               Abnormality         Status                     ---------                               -----------         ------                     CBC Auto Differential[[id number]]        Abnormal            Final result                 Please view results for these tests on the individual orders.    CBC Auto Differential [[id number]]  (Abnormal) Collected:  06/08/17 0516    Specimen:  Blood Updated:  06/08/17 0554     WBC 6.26 10*3/mm3      RBC 3.78 (L) 10*6/mm3      Hemoglobin 8.9 (L) g/dL      Hematocrit 29.8 (L) %      MCV 78.8 (L) fL      MCH 23.5 (L) pg      MCHC 29.9 (L) g/dL      RDW 16.1 (H) %      RDW-SD 46.7 fl      MPV 9.5 fL      Platelets 304 10*3/mm3      Neutrophil % 55.2 %      Lymphocyte % 29.4 %      Monocyte % 10.1 %      Eosinophil % 4.0 %      Basophil % 1.3 %      Immature Grans % 0.0 %      Neutrophils, Absolute 3.46 10*3/mm3      Lymphocytes, Absolute 1.84 10*3/mm3      Monocytes, Absolute 0.63 10*3/mm3      Eosinophils, Absolute 0.25 10*3/mm3      Basophils, Absolute 0.08 10*3/mm3      Immature Grans, Absolute 0.00 10*3/mm3     Comprehensive Metabolic Panel [[id number]]  (Abnormal) Collected:  06/08/17 0516    Specimen:  Blood Updated:  06/08/17 0604     Glucose 102 (H) mg/dL      BUN 19 mg/dL      Creatinine 0.81 mg/dL      Sodium 138 mmol/L      Potassium 4.1 mmol/L      Chloride 104 mmol/L      CO2 21.7 (L) mmol/L      Calcium 8.7 mg/dL      Total Protein 6.2 g/dL      Albumin 3.60 g/dL      ALT (SGPT) 10 U/L      AST (SGOT) 16 U/L      Alkaline Phosphatase 67  U/L      Total Bilirubin 0.2 mg/dL      eGFR Non African Amer 69 mL/min/1.73      Globulin 2.6 gm/dL      A/G Ratio 1.4 g/dL      BUN/Creatinine Ratio 23.5     Anion Gap 12.3 mmol/L     Narrative:       The MDRD GFR formula is only valid for adults with stable renal function between ages 18 and 70.    TSH [[id number]]  (Abnormal) Collected:  06/08/17 0516    Specimen:  Blood Updated:  06/08/17 0613     TSH 21.170 (H) mIU/mL     BNP [[id number]]  (Normal) Collected:  06/08/17 0516    Specimen:  Blood Updated:  06/08/17 0613     proBNP 578.9 pg/mL     Narrative:       Among patients with dyspnea, NT-proBNP is highly sensitive for the detection of acute congestive heart failure. In addition NT-proBNP of <300 pg/ml effectively rules out acute congestive heart failure with 99% negative predictive value.       Potassium 4.2, glucose 116, . Hemoglobin 10.9. The rest of the CBC is normal. Chest x-ray showed no active disease. EKG showed sinus rhythm, LVH, nonspecific changes.   Imaging Results (last 72 hours)     Procedure Component Value Units Date/Time    XR Chest 1 View [[id number]] Collected:  06/07/17 1247     Updated:  06/07/17 1251    Narrative:       XR CHEST 1 VW-     HISTORY: Female who is 71 years-old,  chest pain     TECHNIQUE: Frontal view of the chest     COMPARISON: 5/4/2016     FINDINGS: Patient is rotated to the right. Heart size is borderline.  Sternotomy wires are present. Pulmonary vasculature is unremarkable. No  focal pulmonary consolidation, pleural effusion, or pneumothorax. No  acute osseous process.       Impression:       No evidence for acute pulmonary process. Follow-up as  clinical indications persist.     This report was finalized on 6/7/2017 12:48 PM by Dr. Evangelista Tyson MD.           Current Facility-Administered Medications:   •  acetaminophen (TYLENOL) tablet 650 mg, 650 mg, Oral, Q4H PRN, MODESTO Jc, 650 mg at 06/08/17 1129  •  acetaminophen-codeine (TYLENOL #3) 300-30  MG per tablet 1 tablet, 1 tablet, Oral, Q6H PRN, Gay Saldivar, APRN, 1 tablet at 06/07/17 2048  •  aspirin EC tablet 81 mg, 81 mg, Oral, Daily, Gay Saldivar APRN, 81 mg at 06/08/17 1121  •  atorvastatin (LIPITOR) tablet 20 mg, 20 mg, Oral, Nightly, Gay Saldivar, APRN  •  cyclobenzaprine (FLEXERIL) tablet 10 mg, 10 mg, Oral, TID PRN, Gay Saldivar APRN  •  enoxaparin (LOVENOX) syringe 40 mg, 40 mg, Subcutaneous, Daily, Gay Saldivar APRN, 40 mg at 06/08/17 1121  •  estradiol (ESTRACE) tablet 1 mg, 1 mg, Oral, Daily, Gay Saldivar APRN, 1 mg at 06/08/17 1122  •  hydrALAZINE (APRESOLINE) tablet 50 mg, 50 mg, Oral, Q8H, Gay Saldivar APRN  •  levothyroxine (SYNTHROID, LEVOTHROID) tablet 112 mcg, 112 mcg, Oral, Q AM, Gay Saldivar APRN, 112 mcg at 06/08/17 0647  •  meloxicam (MOBIC) tablet 15 mg, 15 mg, Oral, Daily PRN, Gay Saldivar APRN, 15 mg at 06/07/17 2358  •  metoprolol tartrate (LOPRESSOR) tablet 25 mg, 25 mg, Oral, Daily, Gay Saldivar APRN, 25 mg at 06/08/17 0737  •  nitroglycerin (NITROSTAT) ointment 0.5 inch, 0.5 inch, Topical, Q8H, Gay Saldivar, APRN, 0.5 inch at 06/08/17 1415  •  pantoprazole (PROTONIX) EC tablet 40 mg, 40 mg, Oral, BID AC, West Perkins MD, 40 mg at 06/08/17 1122  •  potassium chloride (MICRO-K) CR capsule 20 mEq, 20 mEq, Oral, BID With Meals, Cathy Hua MD, 20 mEq at 06/08/17 1121  •  sodium chloride 0.9 % flush 1-10 mL, 1-10 mL, Intravenous, PRN, Gay Saldivar, APRN  •  technetium sestamibi (CARDIOLITE) injection 1 dose, 1 dose, Intravenous, Once in imaging, Cathy Hua MD     ASSESSMENT:  1.  Chest pain with known coronary artery disease, consistent with stable angina.   2.  Hypertension.  3.  Hyperlipidemia.  4.  Hypothyroidism.WITH HIGH TSH  5.  Gastroesophageal reflux disease.   6.  Degenerative disk disease.       PLAN  CPM  STRESS TEST TODAY  Adjust medication.   SUPPORTIVE CARE  Stress ulcer and DVT prophylaxis.   Will follow with Dr. Hua. Further  recommendations according to hospital course.       FABRIZIO NEVAREZ MD

## 2017-06-08 NOTE — PROGRESS NOTES
Discharge Planning Assessment  [facility]     Patient Name: Leslie Barber  MRN: [id number]  Today's Date: 6/8/2017    Admit Date: 6/7/2017          Discharge Needs Assessment       06/08/17 1610    Living Environment    Lives With spouse;child(ezequiel), adult    Living Arrangements house    Home Accessibility no concerns    Stair Railings at Home none    Transportation Available car;family or friend will provide    Living Environment    Provides Primary Care For no one    Discharge Needs Assessment    Concerns To Be Addressed no discharge needs identified;denies needs/concerns at this time    Readmission Within The Last 30 Days no previous admission in last 30 days    Equipment Currently Used at Home walker, standard            Discharge Plan       06/08/17 1611    Case Management/Social Work Plan    Plan home    Patient/Family In Agreement With Plan yes    Additional Comments Facesheet verified.  Patient lives with her spouse and adult grandchild in a house.  She is IADLs.  She plans home at [place], denies needs.  CCP will follow................Park Mondragon RN        Discharge Placement     No information found                Demographic Summary       06/08/17 1609    Referral Information    Admission Type inpatient    Arrived From home or self-care    Contact Information    Permission Granted to Share Information With family/designee    Comments spouse, Celso Barber    Primary Care Physician Information    Name Renetta Plascencia MD            Functional Status       06/08/17 1609    Functional Status Current    Ambulation 2-->assistive person    Transferring 0-->independent    Toileting 0-->independent    Bathing 0-->independent    Dressing 0-->independent    Eating 0-->independent    Communication 0-->understands/communicates without difficulty    Functional Status Prior    Ambulation 0-->independent    Transferring 0-->independent    Toileting 0-->independent    Bathing 0-->independent    Dressing 0-->independent     Eating 0-->independent    Communication 0-->understands/communicates without difficulty    Swallowing 0-->swallows foods/liquids without difficulty    IADL    Medications independent    Meal Preparation independent    Housekeeping independent    Laundry independent    Shopping independent    Oral Care independent            Psychosocial     None            Abuse/Neglect     None            Legal     None            Substance Abuse     None            Patient Forms     None          Park Mondragon, RN

## 2017-06-08 NOTE — PLAN OF CARE
Problem: Patient Care Overview (Adult)  Goal: Plan of Care Review  Outcome: Ongoing (interventions implemented as appropriate)    06/08/17 9066   Coping/Psychosocial Response Interventions   Plan Of Care Reviewed With patient   Patient Care Overview   Progress no change   Outcome Evaluation   Outcome Summary/Follow up Plan VSS. Stress test pretormed today, blockage found. PLan for echo this evening and heart cath tomorrow. Will continue to monitor        Goal: Adult Individualization and Mutuality  Outcome: Ongoing (interventions implemented as appropriate)  Goal: Discharge Needs Assessment  Outcome: Ongoing (interventions implemented as appropriate)    Problem: Pain, Acute (Adult)  Goal: Identify Related Risk Factors and Signs and Symptoms  Outcome: Ongoing (interventions implemented as appropriate)

## 2017-06-08 NOTE — PROGRESS NOTES
[facility]  Cardiology Progress Note    Patient Identification:  Name: Leslie Barber  Age: 71 y.o.  Sex: female  :  1945  MRN: [id number]                 Follow Up / Chief Complaint: Chest pain, history of moderate one-vessel CAD , history of MV repair, hypertension, dyslipidemia    Interval History:  Patient seen in office yesterday reporting a several month history of recurrent chest pain     Subjective:    Still have sig cp, palpitation, syncope near syncope      Objective:  CE neg x3   -160s/90s   with normal pulmonary vasculature on chest x-ray   TSH 21.17.  Mixed dyslipidemia - not at goal on current dose of statin    Past Medical History:  Past Medical History:   Diagnosis Date   • Cataract    • Chest pain    • Coronary artery disease     CHRONIC   • GERD (gastroesophageal reflux disease)    • Hyperlipidemia    • Hypertension    • Mitral valve disease    • Myocardial infarction    • Near syncope    • Pain in extremity      Past Surgical History:  Past Surgical History:   Procedure Laterality Date   • APPENDECTOMY     • CERVICAL SPINE SURGERY     • COLONOSCOPY     • ENDOSCOPY     • HYSTERECTOMY     • MITRAL VALVE REPAIR/REPLACEMENT          Social History:   Social History   Substance Use Topics   • Smoking status: Never Smoker   • Smokeless tobacco: Never Used   • Alcohol use No      Family History:  Family History   Problem Relation Age of Onset   • Hypertension Mother    • Heart disease Mother    • Hypertension Father    • Heart disease Father           Allergies:  Allergies   Allergen Reactions   • Olmesartan Other (See Comments)     HEADACHES   • Lisinopril    • Losartan Potassium-Hctz    • Other      ANIMALS - CATS & DOG   • Tree Extract      Scheduled Meds:    aspirin 81 mg Daily   atorvastatin 10 mg Daily   enoxaparin 40 mg Daily   estradiol 1 mg Daily   hydrALAZINE 50 mg BID   levothyroxine 112 mcg Q AM   metoprolol tartrate 25 mg Daily   nitroglycerin 0.5  "inch Q8H   pantoprazole 40 mg BID AC   potassium chloride 20 mEq BID With Meals   technetium sestamibi 1 dose Once in imaging           INTAKE AND OUTPUT:  No intake or output data in the 24 hours ending 06/08/17 0944    Review of Systems:   GI:  NO NAUSEA, VOMITTING  Cardiac:  NO CP, PALPITATION  Pulmonary: NO COUGH, SPUTUM    Constitutional:  Temp:  [97.5 °F (36.4 °C)-98.4 °F (36.9 °C)] 97.9 °F (36.6 °C)  Heart Rate:  [70-90] 70  Resp:  [16-18] 18  BP: (117-161)/(59-90) 160/90    Physical Exam: Performed by Dr Hua             Physical Exam  /79 (BP Location: Right arm, Patient Position: Lying)  Pulse 76  Temp 98.1 °F (36.7 °C) (Oral)   Resp 16  Ht 63\" (160 cm)  Wt 152 lb (68.9 kg)  SpO2 97%  BMI 26.93 kg/m2    General appearance: NAD, conversant   Eyes: anicteric sclerae, moist conjunctivae; no lid-lag; PERRLA   HENT: Atraumatic; oropharynx clear with moist mucous membranes and no mucosal ulcerations;  normal hard and soft palate   Neck: Trachea midline; FROM, supple, no thyromegaly or lymphadenopathy   Lungs: CTA, with normal respiratory effort and no intercostal retractions   CV: S1-S2 regular, no murmurs, no rub, no gallop   Abdomen: Soft, non-tender; no masses or HSM   Extremities: No peripheral edema or extremity lymphadenopathy  Skin: Normal temperature, turgor and texture; no rash, ulcers or subcutaneous nodules   Psych: Appropriate affect, alert and oriented to person, place and time         Cardiographics  Telemetry:       ECG 6--7-2017:       Echocardiogram: pending    Cath 2013  IMPRESSION OF HEART CATHETERIZATION:    1. Normal left main coronary artery.  2. Normal left anterior descending artery and its branches.  3. Normal left circumflex artery and its branches.  4. Normal right coronary artery.  5. Normal left ventricular systolic function.  LVEDP 25 mmHg.  Ejection   fraction 50%  6. Moderate pulmonary hypertension.  7. 3+ mitral regurgitation    RECOMMENDATION:  Considering " significant mitral regurgitation as well as  tricuspid regurgitation patient will be consider for the mitral valve and  tricuspid valve repair.        CXR FINDINGS: Patient is rotated to the right. Heart size is borderline. Sternotomy wires are present. Pulmonary vasculature is unremarkable. No  focal pulmonary consolidation, pleural effusion, or pneumothorax. No acute osseous process.      IMPRESSION:  No evidence for acute pulmonary process. Follow-up as  clinical indications persist.      Lab Review     Results from last 7 days  Lab Units 06/07/17  2356 06/07/17  1807 06/07/17  1206   TROPONIN T ng/mL <0.010 <0.010 <0.010       Results from last 7 days  Lab Units 06/07/17  1206   MAGNESIUM mg/dL 2.4       Results from last 7 days  Lab Units 06/08/17  0516   SODIUM mmol/L 138   POTASSIUM mmol/L 4.1   BUN mg/dL 19   CREATININE mg/dL 0.81   CALCIUM mg/dL 8.7       Results from last 7 days  Lab Units 06/08/17  0516 06/07/17  1206   WBC 10*3/mm3 6.26 6.53   HEMOGLOBIN g/dL 8.9* 10.9*   HEMATOCRIT % 29.8* 36.6   PLATELETS 10*3/mm3 304 368       Results from last 7 days  Lab Units 06/07/17  1206   INR  0.96         Assessment:  - Chest pain  - CAD - 1v CAD 5/2014 (50% RCA)  - h/o MV repair 2013  - HTN  - Dyslipidemia  - Hypothyroidism -> IM  - Abnormal TSH -> IM   TSH 21.17       Plan:  - Chest pain -  No MI. Cath 5/2014 =>50% RCA.    - h/o MV repair 2013 - 2-D echo pending    - HTN -  diastolic pressures running in the 90s  on Lopressor and bid Apresoline.  Will change Apresoline to 3 times a day    - Dyslipidemia - TC, LDL not at goal.  Statin dose has been increased.  Repeat LFTs and lipid panel recommended in 8-12 weeks          Review 2-D echo and stress test.  Will change Apresoline to 3 times a day  Defer management of abnormal TSH to internal medicine.  Lipitor dose is been increased.  Repeat LFTs and lipid panel recommended in 8-12 weeks      Labs/tests ordered for am: BMP      I reviewed the patient's new  "clinical results and treatment plan   I personally viewed and interpreted the patient's EKG/Telemetry data    STRESS TEST POSITIVE ,    Procedure, risks and options of cardiac cath explained to pt INCLUDING BUT NOT LIMITED TO MI, STROKE, DEATH, INFECTION HAEMORRHAGE, . Pt understands well and agrees with no further questions.    )6/8/2017  Cathy Hua MD      EMR Dragon/Transcription:   \"Dictated utilizing Dragon dictation\".     "

## 2017-06-09 ENCOUNTER — APPOINTMENT (OUTPATIENT)
Dept: CARDIOLOGY | Facility: HOSPITAL | Age: 72
End: 2017-06-09
Attending: INTERNAL MEDICINE

## 2017-06-09 LAB
ANION GAP SERPL CALCULATED.3IONS-SCNC: 11.1 MMOL/L
BASOPHILS # BLD AUTO: 0.1 10*3/MM3 (ref 0–0.2)
BASOPHILS NFR BLD AUTO: 1.6 % (ref 0–1.5)
BUN BLD-MCNC: 22 MG/DL (ref 8–23)
BUN/CREAT SERPL: 23.4 (ref 7–25)
CALCIUM SPEC-SCNC: 9.1 MG/DL (ref 8.6–10.5)
CHLORIDE SERPL-SCNC: 104 MMOL/L (ref 98–107)
CO2 SERPL-SCNC: 22.9 MMOL/L (ref 22–29)
CREAT BLD-MCNC: 0.94 MG/DL (ref 0.57–1)
DEPRECATED RDW RBC AUTO: 47.3 FL (ref 37–54)
EOSINOPHIL # BLD AUTO: 0.29 10*3/MM3 (ref 0–0.7)
EOSINOPHIL NFR BLD AUTO: 4.8 % (ref 0.3–6.2)
ERYTHROCYTE [DISTWIDTH] IN BLOOD BY AUTOMATED COUNT: 16 % (ref 11.7–13)
GFR SERPL CREATININE-BSD FRML MDRD: 58 ML/MIN/1.73
GLUCOSE BLD-MCNC: 107 MG/DL (ref 65–99)
HCT VFR BLD AUTO: 30.6 % (ref 35.6–45.5)
HGB BLD-MCNC: 9 G/DL (ref 11.9–15.5)
IMM GRANULOCYTES # BLD: 0 10*3/MM3 (ref 0–0.03)
IMM GRANULOCYTES NFR BLD: 0 % (ref 0–0.5)
INR PPP: 0.97 (ref 0.9–1.1)
LYMPHOCYTES # BLD AUTO: 1.69 10*3/MM3 (ref 0.9–4.8)
LYMPHOCYTES NFR BLD AUTO: 27.8 % (ref 19.6–45.3)
MAGNESIUM SERPL-MCNC: 2.3 MG/DL (ref 1.6–2.4)
MCH RBC QN AUTO: 23.6 PG (ref 26.9–32)
MCHC RBC AUTO-ENTMCNC: 29.4 G/DL (ref 32.4–36.3)
MCV RBC AUTO: 80.1 FL (ref 80.5–98.2)
MONOCYTES # BLD AUTO: 0.65 10*3/MM3 (ref 0.2–1.2)
MONOCYTES NFR BLD AUTO: 10.7 % (ref 5–12)
NEUTROPHILS # BLD AUTO: 3.35 10*3/MM3 (ref 1.9–8.1)
NEUTROPHILS NFR BLD AUTO: 55.1 % (ref 42.7–76)
PLATELET # BLD AUTO: 301 10*3/MM3 (ref 140–500)
PMV BLD AUTO: 9.7 FL (ref 6–12)
POTASSIUM BLD-SCNC: 4.2 MMOL/L (ref 3.5–5.2)
PROTHROMBIN TIME: 12.5 SECONDS (ref 11.7–14.2)
RBC # BLD AUTO: 3.82 10*6/MM3 (ref 3.9–5.2)
SODIUM BLD-SCNC: 138 MMOL/L (ref 136–145)
WBC NRBC COR # BLD: 6.08 10*3/MM3 (ref 4.5–10.7)

## 2017-06-09 PROCEDURE — 99204 OFFICE O/P NEW MOD 45 MIN: CPT | Performed by: INTERNAL MEDICINE

## 2017-06-09 PROCEDURE — 25010000002 MIDAZOLAM PER 1 MG: Performed by: INTERNAL MEDICINE

## 2017-06-09 PROCEDURE — 93306 TTE W/DOPPLER COMPLETE: CPT | Performed by: INTERNAL MEDICINE

## 2017-06-09 PROCEDURE — C1894 INTRO/SHEATH, NON-LASER: HCPCS | Performed by: INTERNAL MEDICINE

## 2017-06-09 PROCEDURE — 25010000002 FENTANYL CITRATE (PF) 100 MCG/2ML SOLUTION: Performed by: INTERNAL MEDICINE

## 2017-06-09 PROCEDURE — 85610 PROTHROMBIN TIME: CPT | Performed by: NURSE PRACTITIONER

## 2017-06-09 PROCEDURE — 0399T HC MYOCARDL STRAIN IMAG QUAN ASSMT PER SESS: CPT

## 2017-06-09 PROCEDURE — C1769 GUIDE WIRE: HCPCS | Performed by: INTERNAL MEDICINE

## 2017-06-09 PROCEDURE — 93306 TTE W/DOPPLER COMPLETE: CPT

## 2017-06-09 PROCEDURE — 83735 ASSAY OF MAGNESIUM: CPT | Performed by: NURSE PRACTITIONER

## 2017-06-09 PROCEDURE — 0 IOPAMIDOL PER 1 ML: Performed by: INTERNAL MEDICINE

## 2017-06-09 PROCEDURE — 4A023N7 MEASUREMENT OF CARDIAC SAMPLING AND PRESSURE, LEFT HEART, PERCUTANEOUS APPROACH: ICD-10-PCS | Performed by: INTERNAL MEDICINE

## 2017-06-09 PROCEDURE — G0378 HOSPITAL OBSERVATION PER HR: HCPCS

## 2017-06-09 PROCEDURE — 80048 BASIC METABOLIC PNL TOTAL CA: CPT | Performed by: NURSE PRACTITIONER

## 2017-06-09 PROCEDURE — B2111ZZ FLUOROSCOPY OF MULTIPLE CORONARY ARTERIES USING LOW OSMOLAR CONTRAST: ICD-10-PCS | Performed by: INTERNAL MEDICINE

## 2017-06-09 PROCEDURE — 93454 CORONARY ARTERY ANGIO S&I: CPT | Performed by: INTERNAL MEDICINE

## 2017-06-09 PROCEDURE — 85025 COMPLETE CBC W/AUTO DIFF WBC: CPT | Performed by: NURSE PRACTITIONER

## 2017-06-09 PROCEDURE — 25010000002 HEPARIN (PORCINE) PER 1000 UNITS: Performed by: INTERNAL MEDICINE

## 2017-06-09 PROCEDURE — 94760 N-INVAS EAR/PLS OXIMETRY 1: CPT

## 2017-06-09 PROCEDURE — 0399T ADULT TRANSTHORACIC ECHO COMPLETE: CPT | Performed by: INTERNAL MEDICINE

## 2017-06-09 RX ORDER — LIDOCAINE HYDROCHLORIDE 20 MG/ML
INJECTION, SOLUTION INFILTRATION; PERINEURAL AS NEEDED
Status: DISCONTINUED | OUTPATIENT
Start: 2017-06-09 | End: 2017-06-09 | Stop reason: HOSPADM

## 2017-06-09 RX ORDER — MIDAZOLAM HYDROCHLORIDE 1 MG/ML
INJECTION INTRAMUSCULAR; INTRAVENOUS AS NEEDED
Status: DISCONTINUED | OUTPATIENT
Start: 2017-06-09 | End: 2017-06-09 | Stop reason: HOSPADM

## 2017-06-09 RX ORDER — METOPROLOL TARTRATE 5 MG/5ML
INJECTION INTRAVENOUS AS NEEDED
Status: DISCONTINUED | OUTPATIENT
Start: 2017-06-09 | End: 2017-06-09 | Stop reason: HOSPADM

## 2017-06-09 RX ORDER — SODIUM CHLORIDE 9 MG/ML
INJECTION, SOLUTION INTRAVENOUS CONTINUOUS PRN
Status: DISCONTINUED | OUTPATIENT
Start: 2017-06-09 | End: 2017-06-09 | Stop reason: HOSPADM

## 2017-06-09 RX ORDER — FENTANYL CITRATE 50 UG/ML
INJECTION, SOLUTION INTRAMUSCULAR; INTRAVENOUS AS NEEDED
Status: DISCONTINUED | OUTPATIENT
Start: 2017-06-09 | End: 2017-06-09 | Stop reason: HOSPADM

## 2017-06-09 RX ORDER — LABETALOL HYDROCHLORIDE 5 MG/ML
INJECTION, SOLUTION INTRAVENOUS AS NEEDED
Status: DISCONTINUED | OUTPATIENT
Start: 2017-06-09 | End: 2017-06-09 | Stop reason: HOSPADM

## 2017-06-09 RX ADMIN — HYDRALAZINE HYDROCHLORIDE 50 MG: 50 TABLET, FILM COATED ORAL at 15:03

## 2017-06-09 RX ADMIN — HYDRALAZINE HYDROCHLORIDE 50 MG: 50 TABLET, FILM COATED ORAL at 07:42

## 2017-06-09 RX ADMIN — NITROGLYCERIN 0.5 INCH: 20 OINTMENT TOPICAL at 06:51

## 2017-06-09 RX ADMIN — ATORVASTATIN CALCIUM 10 MG: 10 TABLET, FILM COATED ORAL at 22:57

## 2017-06-09 RX ADMIN — PANTOPRAZOLE SODIUM 40 MG: 40 TABLET, DELAYED RELEASE ORAL at 18:34

## 2017-06-09 RX ADMIN — METOPROLOL TARTRATE 25 MG: 25 TABLET ORAL at 09:53

## 2017-06-09 RX ADMIN — HYDRALAZINE HYDROCHLORIDE 50 MG: 50 TABLET, FILM COATED ORAL at 22:57

## 2017-06-09 RX ADMIN — POTASSIUM CHLORIDE 20 MEQ: 750 CAPSULE, EXTENDED RELEASE ORAL at 18:34

## 2017-06-09 RX ADMIN — LEVOTHYROXINE SODIUM 150 MCG: 150 TABLET ORAL at 07:43

## 2017-06-09 RX ADMIN — PANTOPRAZOLE SODIUM 40 MG: 40 TABLET, DELAYED RELEASE ORAL at 06:49

## 2017-06-09 RX ADMIN — POTASSIUM CHLORIDE 20 MEQ: 750 CAPSULE, EXTENDED RELEASE ORAL at 09:53

## 2017-06-09 NOTE — PROGRESS NOTES
Is normal, patient continues to complains of shortness of breath, significantly low hemoglobin will have the GI consultation as well as pulmonary consultation

## 2017-06-09 NOTE — DISCHARGE INSTRUCTIONS
[facility]  [address] [place] [address]    Coronary Angiogram (Radial/Ulnar Approach) After Care    Refer to this sheet in the next few weeks. These instructions provide you with information on caring for yourself after your procedure. Your caregiver may also give you more specific instructions. Your treatment has been planned according to current medical practices, but problems sometimes occur. Call your caregiver if you have any problems or questions after your procedure.    Home Care Instructions:  · You may shower the day after the procedure. Remove the bandage (dressing) and gently wash the site with plain soap and water. Gently pat the site dry. You may apply a band aid daily for 2 days if desired.    · Do not apply powder or lotion to the site.  · Do not submerge the affected site in water for 3 to 5 days or until the site is completely healed.   · Do not lift, push or pull anything over 10 pounds for 2 days after your procedure.  · Inspect the site at least twice daily. You may notice some bruising at the site and it may be tender for 1 to 2 weeks.     · Increase your fluid intake for the next 2 days.    · Keep arm elevated for 24 hours. For the remainder of the day, keep your arm in “Pledge of Allegiance” position when up and about.     · You may drive 24 hours after the procedure unless otherwise instructed by your caregiver.  · Do not operate machinery or power tools for 24 hours.  · A responsible adult should be with you for the first 24 hours after you arrive home. Do not make any important legal decisions or sign legal papers for 24 hours.      Call Your Doctor if:   · You have unusual pain at the radial/ulnar (wrist) site.  · You have redness, warmth, swelling, or pain at the radial/ulnar (wrist) site.  · You have drainage (other than a small amount of blood on the dressing).  · You have chills or a fever > 101.  · Your arm becomes pale or dark, cool, tingly, or numb.  · You  have heavy bleeding from the site, hold pressure on the site for 20 minutes.  If the bleeding stops, apply a fresh bandage and call your cardiologist.  However, if you continue to have bleeding, call 911.

## 2017-06-09 NOTE — CONSULTS
Patient Identification:  Leslie Barber  71 y.o.  female  1945  [id number]          LOS 1    Requesting physician: Dr Singer    Reason for Consultation:  Shortness of breath    History of Present Illness:   Thank you for this pulmonary consult.  71-year-old female presents with complaints of chest pain.  She has history of coronary artery disease.  She denies any previous pulmonary disease.  She was never a smoker.  Status post mitral valve repair surgery.  She's had midsternal chest pain without radiation and no diaphoresis.  Pain is described as sharp in nature.  He comes and goes.  She is unable describe other aggravating or alleviating factors.  She denies any productive cough.  Chest x-ray viewed shows no acute pulmonary process.  She is not currently requiring supplemental oxygen.  She has a history of peptic ulcer disease with prior GI bleeding.  2 g drop in hemoglobin since admission noted.  Gastroenterology has also been consulted.   Shortness of breath worse with activity.  Described as mild to moderate in nature.    Past Medical History:  Past Medical History:   Diagnosis Date   • Cataract    • Chest pain    • Coronary artery disease     CHRONIC   • GERD (gastroesophageal reflux disease)    • Hyperlipidemia    • Hypertension    • Mitral valve disease    • Myocardial infarction    • Near syncope    • Pain in extremity        Past Surgical History:  Past Surgical History:   Procedure Laterality Date   • APPENDECTOMY     • CERVICAL SPINE SURGERY     • COLONOSCOPY     • ENDOSCOPY     • HYSTERECTOMY     • MITRAL VALVE REPAIR/REPLACEMENT          Home Meds:  Prescriptions Prior to Admission   Medication Sig Dispense Refill Last Dose   • acetaminophen-codeine (TYLENOL #3) 300-30 MG per tablet Take 1 tablet by mouth Every 6 (Six) Hours As Needed for Moderate Pain (4-6).      • aspirin 81 MG tablet Take 81 mg by mouth Daily.   Taking   • cetirizine (ZyrTEC) 5 MG tablet Take 5 mg by mouth 2 (Two)  Times a Day.   Taking   • cyclobenzaprine (FLEXERIL) 10 MG tablet Take 10 mg by mouth 3 (Three) Times a Day As Needed for Muscle Spasms.      • estradiol (ESTRACE) 1 MG tablet TAKE ONE TABLET BY MOUTH EVERY DAY 90 tablet 0 Taking   • hydrALAZINE (APRESOLINE) 50 MG tablet Take 50 mg by mouth 2 (Two) Times a Day.   Taking   • levothyroxine (SYNTHROID, LEVOTHROID) 112 MCG tablet Take 112 mcg by mouth Daily.   Taking   • meloxicam (MOBIC) 15 MG tablet Take 15 mg by mouth Daily.      • metoprolol tartrate (LOPRESSOR) 25 MG tablet Take 25 mg by mouth Daily.   6/8/2017   • NON FORMULARY Daily. Tumeric      • oxybutynin XL (DITROPAN-XL) 10 MG 24 hr tablet Take 10 mg by mouth Daily.      • pantoprazole (PROTONIX) 40 MG EC tablet Take 40 mg by mouth Daily.   Taking   • potassium chloride (KLOR-CON) 20 MEQ packet Take 20 mEq by mouth 2 (Two) Times a Day.      • simvastatin (ZOCOR) 20 MG tablet Take 20 mg by mouth Every Night.   Taking   • Solifenacin Succinate (VESICARE PO) Take 5 mg by mouth Daily.   Taking         Allergies:  Allergies   Allergen Reactions   • Olmesartan Other (See Comments)     HEADACHES   • Lisinopril    • Losartan Potassium-Hctz    • Other      ANIMALS - CATS & DOG   • Tree Extract        Social History:   Social History     Social History   • Marital status:      Spouse name: N/A   • Number of children: N/A   • Years of education: N/A     Occupational History   • Not on file.     Social History Main Topics   • Smoking status: Never Smoker   • Smokeless tobacco: Never Used   • Alcohol use No   • Drug use: No   • Sexual activity: Defer     Other Topics Concern   • Not on file     Social History Narrative       Family History:  Family History   Problem Relation Age of Onset   • Hypertension Mother    • Heart disease Mother    • Hypertension Father    • Heart disease Father        Review of Systems:  Denies fevers or chills  Denies nausea or vomiting  No new vision or hearing changes  Positive for  "chest pain  Positive for shortness of breath  No diarrhea, hematemesis or hematochezia, no dysuria or frequency  No musculoskeletal complaints  No heat or cold intolerance  No skin rashes  No dizziness or confusion.  No seizure activity  No new anxiety or depression  12 system review of systems performed and all else negative    Objective:    PHYSICAL EXAM:    /71 (BP Location: Right arm, Patient Position: Lying)  Pulse 67  Temp 98 °F (36.7 °C) (Oral)   Resp 16  Ht 63\" (160 cm)  Wt 152 lb 3.2 oz (69 kg)  SpO2 99%  BMI 26.96 kg/m2 Body mass index is 26.96 kg/(m^2). 99% 152 lb 3.2 oz (69 kg)    GENERAL APPEARANCE:   · Well developed  · Well nourished  · No acute distress   EYES:    · PERRL                                                                           · Conjunctiva normal  · Sclera non-icteric.  HENT:   · Atraumatic, normocephalic  · External ears and nose normal  · Moist mucus membranes and no ulcers  NECK:  · Thyroid not enlarged  · Trachea midline   RESPIRATORY:    · Nonlabored breathing   · Normal breath sounds  · No rales. No wheezing  · No dullness  CARDIOVASCULAR:    · RRR  · Normal S1, S2  · No murmur  · Lower extremity edema: none    GI:   · Bowel sounds normal  · Abdomen soft , non-distended, non-tender  · No abdominal masses.    MUSCULOSKELETAL:  · Normal movement of extremities  · No tenderness, no deformities  · No clubbing or cyanosis   Skin:    · No visible rashes  · No palpable nodules  PSYCHIATRIC:  · Speech and behavior appropriate  · Normal mood and affect  · Oriented to person, place and time  NEUROLOGIC:      Lab Review:      Lab Results   Component Value Date    WBC 6.08 06/09/2017    HGB 9.0 (L) 06/09/2017    HCT 30.6 (L) 06/09/2017    MCV 80.1 (L) 06/09/2017     06/09/2017            Lab Results   Component Value Date    CREATININE 0.94 06/09/2017    BUN 22 06/09/2017     06/09/2017    K 4.2 06/09/2017     06/09/2017    CO2 22.9 06/09/2017        Lab " Results   Component Value Date    TROPONINT <0.010 06/07/2017     TSH 21.1           Imaging reviewed  chest X-ray viewed shows no acute pulmonary process.  Old surgical history with sternal wires noted.       Assessment:  Shortness of breath  Chest pain  History of mitral valve repair 2013  Acute anemia with hx of PUD  Hypertension  Hyperlipidemia  hypothyroidism    Recommendations:  Chest pain seems to be atypical.  Shortness of breath is mild to moderate but comes and goes.  Currently on room air and appears comfortable.  No wheezing or rhonchi on auscultation.  No need for bronchodilator therapy.  Encourage incentive spirometer use.  2-D echo result is pending.  EGD is planned for tomorrow for further evaluation of her acute anemia and history of peptic ulcer disease.      Thank you for allowing me to participate in the care of this patient.  I will continue to follow along with you.      Timothy Tripp MD  [facility], [facility]  Pulmonary and Critical Care Medicine    6/9/2017  5:13 PM

## 2017-06-09 NOTE — PLAN OF CARE
Problem: Patient Care Overview (Adult)  Goal: Plan of Care Review  Outcome: Ongoing (interventions implemented as appropriate)    06/09/17 0553   Coping/Psychosocial Response Interventions   Plan Of Care Reviewed With patient   Patient Care Overview   Progress improving   Outcome Evaluation   Outcome Summary/Follow up Plan VSS. BP elevated. Cardiac Cath scheduled for today around noon. Pain controlled with nitro paste through shift. Pt c/o fatigue. Will continue to monitor.        Goal: Adult Individualization and Mutuality  Outcome: Ongoing (interventions implemented as appropriate)    Problem: Pain, Acute (Adult)  Goal: Identify Related Risk Factors and Signs and Symptoms  Outcome: Ongoing (interventions implemented as appropriate)  Goal: Acceptable Pain Control/Comfort Level  Outcome: Ongoing (interventions implemented as appropriate)    Problem: Cardiac Catheterization with/without PCI (Adult)  Goal: Signs and Symptoms of Listed Potential Problems Will be Absent or Manageable (Cardiac Catheterization with/without PCI)  Outcome: Ongoing (interventions implemented as appropriate)

## 2017-06-09 NOTE — CONSULTS
"[facility]  Initial Inpatient Consult Note     Referring Provider: Dr landry    Reason for Consultation: anemia    Subjective     History of present illness:  HPI:  71-year-old female patient, previously unknown to our practice, direct admitted by cardiology with complaints of mid sternal chest pain and associated shortness of air.  Known history of CAD.  She denied any nausea, vomiting or radiation of discomfort.  Patient underwent stress test which was positive and subsequent cardiac catheter on 6/9.  Cardiac catheter demonstrated normal coronary arteries with a false positive stress test.  Prior to admission patient was on aspirin daily as well as mobic for arthritis pain.    Other past medical history pertinent for hypertension, hyperlipidemia, pulmonary hypertension, GERD well-controlled on home PPI, PUD with prior episodes of GI bleeding, and the aforementioned coronary artery disease with prior mitral valve replacement.    GI has been consulted for anemia.  Hemoglobin of 10.9 on 6/7 with drop to 8.9.  Chart review demonstrates prior hemoglobin in May 2016 of 11.5.  Patient has normal BUN and creatinine of 19 and 0.8 respectively, elevated TSH of 21.1, normal white count of 6.2 and normal platelet count.  She denies any hematemesis or dark tarry stools.  Patient does have history of \"bleeding ulcers\" first episode approximately 20 years ago and most recently in 2009.  She has undergone prior EGD at [place] during her 2009 episode with \"bleeding ulcers\".  She has had prior colonoscopy done approximately 2-3 years ago that per her report was normal.  Unfortunately, she is unable to recall the provider or the facility at which she has had this done in order to obtain any records.  Patient is currently on Mobic for complaints of arthritis.  At the time of the consult she is complaining of intermittent midsternal and epigastric discomfort which prompted her admission.  She describes " it as a dullness that has not been associated with nausea or vomiting.  It is not related to food intake.  She denies any dysphagia or odynophagia.      Past Medical History:  Past Medical History:   Diagnosis Date   • Cataract    • Chest pain    • Coronary artery disease     CHRONIC   • GERD (gastroesophageal reflux disease)    • Hyperlipidemia    • Hypertension    • Mitral valve disease    • Myocardial infarction    • Near syncope    • Pain in extremity        Past Surgical History:  Past Surgical History:   Procedure Laterality Date   • APPENDECTOMY     • CERVICAL SPINE SURGERY     • COLONOSCOPY     • ENDOSCOPY     • HYSTERECTOMY     • MITRAL VALVE REPAIR/REPLACEMENT          Social History:   Social History   Substance Use Topics   • Smoking status: Never Smoker   • Smokeless tobacco: Never Used   • Alcohol use No        Family History:  Family History   Problem Relation Age of Onset   • Hypertension Mother    • Heart disease Mother    • Hypertension Father    • Heart disease Father        Home Meds:  Prescriptions Prior to Admission   Medication Sig Dispense Refill Last Dose   • acetaminophen-codeine (TYLENOL #3) 300-30 MG per tablet Take 1 tablet by mouth Every 6 (Six) Hours As Needed for Moderate Pain (4-6).      • aspirin 81 MG tablet Take 81 mg by mouth Daily.   Taking   • cetirizine (ZyrTEC) 5 MG tablet Take 5 mg by mouth 2 (Two) Times a Day.   Taking   • cyclobenzaprine (FLEXERIL) 10 MG tablet Take 10 mg by mouth 3 (Three) Times a Day As Needed for Muscle Spasms.      • estradiol (ESTRACE) 1 MG tablet TAKE ONE TABLET BY MOUTH EVERY DAY 90 tablet 0 Taking   • hydrALAZINE (APRESOLINE) 50 MG tablet Take 50 mg by mouth 2 (Two) Times a Day.   Taking   • levothyroxine (SYNTHROID, LEVOTHROID) 112 MCG tablet Take 112 mcg by mouth Daily.   Taking   • meloxicam (MOBIC) 15 MG tablet Take 15 mg by mouth Daily.      • metoprolol tartrate (LOPRESSOR) 25 MG tablet Take 25 mg by mouth Daily.   6/8/2017   • NON FORMULARY  Daily. Tumeric      • oxybutynin XL (DITROPAN-XL) 10 MG 24 hr tablet Take 10 mg by mouth Daily.      • pantoprazole (PROTONIX) 40 MG EC tablet Take 40 mg by mouth Daily.   Taking   • potassium chloride (KLOR-CON) 20 MEQ packet Take 20 mEq by mouth 2 (Two) Times a Day.      • simvastatin (ZOCOR) 20 MG tablet Take 20 mg by mouth Every Night.   Taking   • Solifenacin Succinate (VESICARE PO) Take 5 mg by mouth Daily.   Taking       Current Meds:     aspirin 81 mg Oral Daily   atorvastatin 10 mg Oral Nightly   enoxaparin 40 mg Subcutaneous Daily   estradiol 1 mg Oral Daily   hydrALAZINE 50 mg Oral Q8H   levothyroxine 150 mcg Oral Q AM   metoprolol tartrate 25 mg Oral Daily   pantoprazole 40 mg Oral BID AC   potassium chloride 20 mEq Oral BID With Meals   technetium sestamibi 1 dose Intravenous Once in imaging       Allergies:  Allergies   Allergen Reactions   • Olmesartan Other (See Comments)     HEADACHES   • Lisinopril    • Losartan Potassium-Hctz    • Other      ANIMALS - CATS & DOG   • Tree Extract        Review of Systems  There is weakness and fatigue all other systems are reviewed and are negative     Objective     Vital Signs  Temp:  [98 °F (36.7 °C)-98.3 °F (36.8 °C)] 98 °F (36.7 °C)  Heart Rate:  [64-85] 67  Resp:  [16-20] 16  BP: (120-184)/(71-99) 151/71    Physical Exam:  General Appearance:    Alert, cooperative, in no acute distress   Head:    Normocephalic, without obvious abnormality, atraumatic   Eyes:            Lids and lashes normal, conjunctivae and sclerae normal, no   icterus   Throat:   No oral lesions, no thrush, oral mucosa moist   Neck:   No adenopathy, supple, trachea midline, no thyromegaly, no   carotid bruit, no JVD   Lungs:     Clear to auscultation,respirations regular, even and                   unlabored    Heart:    Regular rhythm and normal rate, normal S1 and S2, no            murmur, no gallop, no rub, no click   Chest Wall:    No abnormalities observed   Abdomen:     Normal bowel  sounds, no masses, no organomegaly, soft        non-tender, non-distended, no guarding, no rebound                 tenderness   Rectal:     Deferred   Extremities:   no edema, no cyanosis, no redness   Skin:   No bleeding, bruising or rash   Lymph nodes:   No palpable adenopathy   Psychiatric:  Judgement and insight: normal   Orientation to person place and time: normal   Mood and affect: normal     Results Review:   I reviewed the patient's new clinical results.      Results from last 7 days  Lab Units 06/09/17  0516 06/08/17  0516 06/07/17  1206   WBC 10*3/mm3 6.08 6.26 6.53   HEMOGLOBIN g/dL 9.0* 8.9* 10.9*   HEMATOCRIT % 30.6* 29.8* 36.6   PLATELETS 10*3/mm3 301 304 368         Results from last 7 days  Lab Units 06/09/17  0516 06/08/17  0516 06/07/17  1206   SODIUM mmol/L 138 138 140   POTASSIUM mmol/L 4.2 4.1 4.2   CHLORIDE mmol/L 104 104 103   TOTAL CO2 mmol/L 22.9 21.7* 20.1*   BUN mg/dL 22 19 21   CREATININE mg/dL 0.94 0.81 0.85   CALCIUM mg/dL 9.1 8.7 9.9   BILIRUBIN mg/dL  --  0.2 0.3   ALK PHOS U/L  --  67 92   ALT (SGPT) U/L  --  10 16   AST (SGOT) U/L  --  16 24   GLUCOSE mg/dL 107* 102* 116*         Results from last 7 days  Lab Units 06/09/17  0516 06/07/17  1206   INR  0.97 0.96       No results found for: LIPASE    Radiology:  Imaging Results (last 72 hours)     Procedure Component Value Units Date/Time    XR Chest 1 View [[id number]] Collected:  06/07/17 1247     Updated:  06/07/17 1251    Narrative:       XR CHEST 1 VW-     HISTORY: Female who is 71 years-old,  chest pain     TECHNIQUE: Frontal view of the chest     COMPARISON: 5/4/2016     FINDINGS: Patient is rotated to the right. Heart size is borderline.  Sternotomy wires are present. Pulmonary vasculature is unremarkable. No  focal pulmonary consolidation, pleural effusion, or pneumothorax. No  acute osseous process.       Impression:       No evidence for acute pulmonary process. Follow-up as  clinical indications persist.     This report  was finalized on 6/7/2017 12:48 PM by Dr. Evangelista Tyson MD.             Assessment/Plan     Active Problems:    Unstable angina    Hypotension        Assessment:  1) Anemia- 2 gm drop in Hgb since admission.  Hx of PUD with prior GI bleeding, on mobic for arthritis pain as out pt.  Will obtain anemia labs and perform EGD tomorrow for further evaluation.    - Procedure to include risks not limited to reaction to anesthesia, perforation and infection discussed with patient and she is agreeable to proceeding based upon prior history, anemia and epigastric pain.    - Continue BID Protonix, a drip is not indicated at this time in the absence of any hematemesis or overt GI bleeding.   - Hold morning dose of Lovenox.  If EGD findings are positive with prior history of bleeding would recommend discontinuation of NSAIDs  - F/U on anemia labs and hemocult stool     I discussed the patients findings and my recommendations with patient, family and nursing staff

## 2017-06-09 NOTE — PROGRESS NOTES
" DAILY PROGRESS NOTE    CHIEF COMPLAINTS  SOB       HISTORY OF PRESENT ILLNESS: This 71-year-old white female with known coronary artery disease, other medical problems, hypertension, hyperlipidemia, mitral valve repair, degenerative disk disease, pulmonary hypertension, hypothyroidism, gastroesophageal reflux disease, directly admitted to Cardiology service with substernal chest pain that she woke up with, with some shortness of breath. No nausea, vomiting, diaphoresis. No radiation. Lasted a few minutes and relieved by itself. She has known coronary artery disease. Admitted for further workup; at the time of interview she is pain-free. No fever or chills. No night sweats. No other complaint.    PHYSICAL EXAMINATION:Blood pressure 120/99, pulse 65, temperature 98 °F (36.7 °C), temperature source Oral, resp. rate 16, height 63\" (160 cm), weight 152 lb 3.2 oz (69 kg), SpO2 (!) 1 %.    GENERAL: Alert, oriented, no distress.   HEENT: Unremarkable.  NECK: Supple.  LUNGS: Clear.  HEART: S1, S2.   ABDOMEN: Soft, nontender. Bowel sounds positive.  EXTREMITIES: No edema.  NEUROLOGIC: No focal deficit.     DIAGNOSTIC DATA:  Lab Results (last 24 hours)     Procedure Component Value Units Date/Time    T4, Free [[id number]]  (Normal) Collected:  06/08/17 0516    Specimen:  Blood Updated:  06/08/17 1621     Free T4 1.11 ng/dL     T3, Free [[id number]]  (Normal) Collected:  06/08/17 0516    Specimen:  Blood Updated:  06/08/17 1621     T3, Free 2.03 pg/mL     CBC & Differential [[id number]] Collected:  06/09/17 0516    Specimen:  Blood Updated:  06/09/17 0553    Narrative:       The following orders were created for panel order CBC & Differential.  Procedure                               Abnormality         Status                     ---------                               -----------         ------                     CBC Auto Differential[[id number]]        Abnormal            Final result                 Please view results for " these tests on the individual orders.    CBC Auto Differential [[id number]]  (Abnormal) Collected:  06/09/17 0516    Specimen:  Blood Updated:  06/09/17 0553     WBC 6.08 10*3/mm3      RBC 3.82 (L) 10*6/mm3      Hemoglobin 9.0 (L) g/dL      Hematocrit 30.6 (L) %      MCV 80.1 (L) fL      MCH 23.6 (L) pg      MCHC 29.4 (L) g/dL      RDW 16.0 (H) %      RDW-SD 47.3 fl      MPV 9.7 fL      Platelets 301 10*3/mm3      Neutrophil % 55.1 %      Lymphocyte % 27.8 %      Monocyte % 10.7 %      Eosinophil % 4.8 %      Basophil % 1.6 (H) %      Immature Grans % 0.0 %      Neutrophils, Absolute 3.35 10*3/mm3      Lymphocytes, Absolute 1.69 10*3/mm3      Monocytes, Absolute 0.65 10*3/mm3      Eosinophils, Absolute 0.29 10*3/mm3      Basophils, Absolute 0.10 10*3/mm3      Immature Grans, Absolute 0.00 10*3/mm3     Protime-INR [[id number]]  (Normal) Collected:  06/09/17 0516    Specimen:  Blood Updated:  06/09/17 0607     Protime 12.5 Seconds      INR 0.97    Magnesium [[id number]]  (Normal) Collected:  06/09/17 0516    Specimen:  Blood Updated:  06/09/17 0620     Magnesium 2.3 mg/dL     Basic Metabolic Panel [[id number]]  (Abnormal) Collected:  06/09/17 0516    Specimen:  Blood Updated:  06/09/17 0620     Glucose 107 (H) mg/dL      BUN 22 mg/dL      Creatinine 0.94 mg/dL      Sodium 138 mmol/L      Potassium 4.2 mmol/L      Chloride 104 mmol/L      CO2 22.9 mmol/L      Calcium 9.1 mg/dL      eGFR Non African Amer 58 (L) mL/min/1.73      BUN/Creatinine Ratio 23.4     Anion Gap 11.1 mmol/L     Narrative:       The MDRD GFR formula is only valid for adults with stable renal function between ages 18 and 70.       Potassium 4.2, glucose 116, . Hemoglobin 10.9. The rest of the CBC is normal. Chest x-ray showed no active disease. EKG showed sinus rhythm, LVH, nonspecific changes.   Imaging Results (last 72 hours)     Procedure Component Value Units Date/Time    XR Chest 1 View [[id number]] Collected:  06/07/17 1247     Updated:   06/07/17 1251    Narrative:       XR CHEST 1 VW-     HISTORY: Female who is 71 years-old,  chest pain     TECHNIQUE: Frontal view of the chest     COMPARISON: 5/4/2016     FINDINGS: Patient is rotated to the right. Heart size is borderline.  Sternotomy wires are present. Pulmonary vasculature is unremarkable. No  focal pulmonary consolidation, pleural effusion, or pneumothorax. No  acute osseous process.       Impression:       No evidence for acute pulmonary process. Follow-up as  clinical indications persist.     This report was finalized on 6/7/2017 12:48 PM by Dr. Evangelista Tyson MD.           Current Facility-Administered Medications:   •  [MAR Hold] acetaminophen (TYLENOL) tablet 650 mg, 650 mg, Oral, Q4H PRN, Gay Saldivar APRN, 650 mg at 06/08/17 1129  •  [MAR Hold] acetaminophen-codeine (TYLENOL #3) 300-30 MG per tablet 1 tablet, 1 tablet, Oral, Q6H PRN, Gay Saldivar APRN, 1 tablet at 06/07/17 2048  •  [MAR Hold] aspirin EC tablet 81 mg, 81 mg, Oral, Daily, Gay Saldivar APRN, 81 mg at 06/08/17 1121  •  [MAR Hold] atorvastatin (LIPITOR) tablet 10 mg, 10 mg, Oral, Nightly, West Perkins MD, 10 mg at 06/08/17 2148  •  [MAR Hold] cyclobenzaprine (FLEXERIL) tablet 10 mg, 10 mg, Oral, TID PRN, Gay Saldivar APRN  •  [MAR Hold] enoxaparin (LOVENOX) syringe 40 mg, 40 mg, Subcutaneous, Daily, Gya Saldivar APRN, 40 mg at 06/08/17 1121  •  [MAR Hold] estradiol (ESTRACE) tablet 1 mg, 1 mg, Oral, Daily, Gay Saldivar APRN, 1 mg at 06/08/17 1122  •  hydrALAZINE (APRESOLINE) tablet 50 mg, 50 mg, Oral, Q8H, Gay Saldivar APRN, 50 mg at 06/09/17 0742  •  [MAR Hold] levothyroxine (SYNTHROID, LEVOTHROID) tablet 150 mcg, 150 mcg, Oral, Q AM, West Perkins MD, 150 mcg at 06/09/17 0743  •  [MAR Hold] meloxicam (MOBIC) tablet 15 mg, 15 mg, Oral, Daily PRN, MODESTO Jc, 15 mg at 06/07/17 5148  •  metoprolol tartrate (LOPRESSOR) tablet 25 mg, 25 mg, Oral, Daily, MODESTO Jc, 25 mg at 06/09/17 0953  •  [MAR Hold]  nitroglycerin (NITROSTAT) ointment 0.5 inch, 0.5 inch, Topical, Q8H, Gay Saldivar, APRN, 0.5 inch at 06/09/17 0651  •  [MAR Hold] pantoprazole (PROTONIX) EC tablet 40 mg, 40 mg, Oral, BID AC, Fabrizio Perkins MD, 40 mg at 06/09/17 0649  •  potassium chloride (MICRO-K) CR capsule 20 mEq, 20 mEq, Oral, BID With Meals, Cathy Hua MD, 20 mEq at 06/09/17 0953  •  [MAR Hold] sodium chloride 0.9 % flush 1-10 mL, 1-10 mL, Intravenous, PRN, Gay Saldivar, APRN  •  [MAR Hold] technetium sestamibi (CARDIOLITE) injection 1 dose, 1 dose, Intravenous, Once in imaging, Cathy Hua MD     STRESS TEST NO ISCHEMIA    CARDIAC CATH WNL  ASSESSMENT:  1.  Chest pain with known coronary artery disease, ATYPICAL  2.  Hypertension.  3.  Hyperlipidemia.  4.  Hypothyroidism.WITH HIGH TSH  5.  Gastroesophageal reflux disease.   6.  Degenerative disk disease.       PLAN  CPM  STRESS TEST NEDATIVE/ CARDIAC CATH WNL  PULM EVAL PENDING  Adjust medication.   SUPPORTIVE CARE  Stress ulcer and DVT prophylaxis.   Will follow with Dr. Hua. Further recommendations according to hospital course.       FABRIZIO PERKINS MD

## 2017-06-09 NOTE — PLAN OF CARE
Problem: Patient Care Overview (Adult)  Goal: Plan of Care Review    06/09/17 4279   Coping/Psychosocial Response Interventions   Plan Of Care Reviewed With patient   Patient Care Overview   Progress progress toward functional goals as expected   Outcome Evaluation   Outcome Summary/Follow up Plan Pt VSS, Cardiac Cath negative, consult to GI and Pulmonary, Pt to have EGD 6/10/12. Will continue to monitor.

## 2017-06-10 ENCOUNTER — ANESTHESIA EVENT (OUTPATIENT)
Dept: GASTROENTEROLOGY | Facility: HOSPITAL | Age: 72
End: 2017-06-10

## 2017-06-10 ENCOUNTER — ANESTHESIA (OUTPATIENT)
Dept: GASTROENTEROLOGY | Facility: HOSPITAL | Age: 72
End: 2017-06-10

## 2017-06-10 LAB
ANION GAP SERPL CALCULATED.3IONS-SCNC: 11.3 MMOL/L
BASOPHILS # BLD AUTO: 0.07 10*3/MM3 (ref 0–0.2)
BASOPHILS NFR BLD AUTO: 1.1 % (ref 0–1.5)
BH CV ECHO MEAS - ACS: 1.5 CM
BH CV ECHO MEAS - AI DEC SLOPE: 506 CM/SEC^2
BH CV ECHO MEAS - AI MAX PG: 78.1 MMHG
BH CV ECHO MEAS - AI MAX VEL: 442 CM/SEC
BH CV ECHO MEAS - AI P1/2T: 255.8 MSEC
BH CV ECHO MEAS - AO MAX PG (FULL): 2.9 MMHG
BH CV ECHO MEAS - AO MAX PG: 6.9 MMHG
BH CV ECHO MEAS - AO MEAN PG (FULL): 2 MMHG
BH CV ECHO MEAS - AO MEAN PG: 4 MMHG
BH CV ECHO MEAS - AO V2 MAX: 131 CM/SEC
BH CV ECHO MEAS - AO V2 MEAN: 89.3 CM/SEC
BH CV ECHO MEAS - AO V2 VTI: 29.5 CM
BH CV ECHO MEAS - AVA(I,A): 1.9 CM^2
BH CV ECHO MEAS - AVA(I,D): 1.9 CM^2
BH CV ECHO MEAS - AVA(V,A): 1.9 CM^2
BH CV ECHO MEAS - AVA(V,D): 1.9 CM^2
BH CV ECHO MEAS - BSA(HAYCOCK): 1.8 M^2
BH CV ECHO MEAS - BSA: 1.7 M^2
BH CV ECHO MEAS - BZI_BMI: 26.9 KILOGRAMS/M^2
BH CV ECHO MEAS - BZI_METRIC_HEIGHT: 160 CM
BH CV ECHO MEAS - BZI_METRIC_WEIGHT: 68.9 KG
BH CV ECHO MEAS - CONTRAST EF (2CH): 59.8 ML/M^2
BH CV ECHO MEAS - CONTRAST EF 4CH: 53.2 ML/M^2
BH CV ECHO MEAS - EDV(CUBED): 79.5 ML
BH CV ECHO MEAS - EDV(MOD-SP2): 87 ML
BH CV ECHO MEAS - EDV(MOD-SP4): 79 ML
BH CV ECHO MEAS - EDV(TEICH): 83.1 ML
BH CV ECHO MEAS - EF(CUBED): 69.3 %
BH CV ECHO MEAS - EF(MOD-SP2): 59.8 %
BH CV ECHO MEAS - EF(MOD-SP4): 53.2 %
BH CV ECHO MEAS - EF(TEICH): 61.2 %
BH CV ECHO MEAS - ESV(CUBED): 24.4 ML
BH CV ECHO MEAS - ESV(MOD-SP2): 35 ML
BH CV ECHO MEAS - ESV(MOD-SP4): 37 ML
BH CV ECHO MEAS - ESV(TEICH): 32.2 ML
BH CV ECHO MEAS - FS: 32.6 %
BH CV ECHO MEAS - IVS/LVPW: 1
BH CV ECHO MEAS - IVSD: 1 CM
BH CV ECHO MEAS - LAT PEAK E' VEL: 6 CM/SEC
BH CV ECHO MEAS - LV DIASTOLIC VOL/BSA (35-75): 45.9 ML/M^2
BH CV ECHO MEAS - LV MASS(C)D: 142.5 GRAMS
BH CV ECHO MEAS - LV MASS(C)DI: 82.8 GRAMS/M^2
BH CV ECHO MEAS - LV MAX PG: 4 MMHG
BH CV ECHO MEAS - LV MEAN PG: 2 MMHG
BH CV ECHO MEAS - LV SYSTOLIC VOL/BSA (12-30): 21.5 ML/M^2
BH CV ECHO MEAS - LV V1 MAX: 100 CM/SEC
BH CV ECHO MEAS - LV V1 MEAN: 69.4 CM/SEC
BH CV ECHO MEAS - LV V1 VTI: 21.8 CM
BH CV ECHO MEAS - LVIDD: 4.3 CM
BH CV ECHO MEAS - LVIDS: 2.9 CM
BH CV ECHO MEAS - LVLD AP2: 6.8 CM
BH CV ECHO MEAS - LVLD AP4: 6.5 CM
BH CV ECHO MEAS - LVLS AP2: 5.9 CM
BH CV ECHO MEAS - LVLS AP4: 5.8 CM
BH CV ECHO MEAS - LVOT AREA (M): 2.5 CM^2
BH CV ECHO MEAS - LVOT AREA: 2.5 CM^2
BH CV ECHO MEAS - LVOT DIAM: 1.8 CM
BH CV ECHO MEAS - LVPWD: 1 CM
BH CV ECHO MEAS - MED PEAK E' VEL: 6 CM/SEC
BH CV ECHO MEAS - MV A DUR: 0.17 SEC
BH CV ECHO MEAS - MV A MAX VEL: 69.6 CM/SEC
BH CV ECHO MEAS - MV DEC SLOPE: 1020 CM/SEC^2
BH CV ECHO MEAS - MV DEC TIME: 0.2 SEC
BH CV ECHO MEAS - MV E MAX VEL: 197 CM/SEC
BH CV ECHO MEAS - MV E/A: 2.8
BH CV ECHO MEAS - MV MAX PG: 14.1 MMHG
BH CV ECHO MEAS - MV MEAN PG: 4 MMHG
BH CV ECHO MEAS - MV P1/2T MAX VEL: 203 CM/SEC
BH CV ECHO MEAS - MV P1/2T: 58.3 MSEC
BH CV ECHO MEAS - MV V2 MAX: 188 CM/SEC
BH CV ECHO MEAS - MV V2 MEAN: 92.6 CM/SEC
BH CV ECHO MEAS - MV V2 VTI: 41.2 CM
BH CV ECHO MEAS - MVA P1/2T LCG: 1.1 CM^2
BH CV ECHO MEAS - MVA(P1/2T): 3.8 CM^2
BH CV ECHO MEAS - MVA(VTI): 1.3 CM^2
BH CV ECHO MEAS - PA ACC TIME: 0.11 SEC
BH CV ECHO MEAS - PA MAX PG (FULL): 2.4 MMHG
BH CV ECHO MEAS - PA MAX PG: 4.4 MMHG
BH CV ECHO MEAS - PA PR(ACCEL): 28.2 MMHG
BH CV ECHO MEAS - PA V2 MAX: 105 CM/SEC
BH CV ECHO MEAS - PULM A REVS DUR: 0.17 SEC
BH CV ECHO MEAS - PULM A REVS VEL: 29.6 CM/SEC
BH CV ECHO MEAS - PULM DIAS VEL: 82.2 CM/SEC
BH CV ECHO MEAS - PULM S/D: 0.63
BH CV ECHO MEAS - PULM SYS VEL: 52 CM/SEC
BH CV ECHO MEAS - PVA(V,A): 1.5 CM^2
BH CV ECHO MEAS - PVA(V,D): 1.5 CM^2
BH CV ECHO MEAS - QP/QS: 0.64
BH CV ECHO MEAS - RAP SYSTOLE: 3 MMHG
BH CV ECHO MEAS - RV MAX PG: 2.1 MMHG
BH CV ECHO MEAS - RV MEAN PG: 1 MMHG
BH CV ECHO MEAS - RV V1 MAX: 71.7 CM/SEC
BH CV ECHO MEAS - RV V1 MEAN: 48.1 CM/SEC
BH CV ECHO MEAS - RV V1 VTI: 15.6 CM
BH CV ECHO MEAS - RVOT AREA: 2.3 CM^2
BH CV ECHO MEAS - RVOT DIAM: 1.7 CM
BH CV ECHO MEAS - RVSP: 53.1 MMHG
BH CV ECHO MEAS - SI(CUBED): 32 ML/M^2
BH CV ECHO MEAS - SI(LVOT): 32.2 ML/M^2
BH CV ECHO MEAS - SI(MOD-SP2): 30.2 ML/M^2
BH CV ECHO MEAS - SI(MOD-SP4): 24.4 ML/M^2
BH CV ECHO MEAS - SI(TEICH): 29.6 ML/M^2
BH CV ECHO MEAS - SV(CUBED): 55.1 ML
BH CV ECHO MEAS - SV(LVOT): 55.5 ML
BH CV ECHO MEAS - SV(MOD-SP2): 52 ML
BH CV ECHO MEAS - SV(MOD-SP4): 42 ML
BH CV ECHO MEAS - SV(RVOT): 35.4 ML
BH CV ECHO MEAS - SV(TEICH): 50.9 ML
BH CV ECHO MEAS - TAPSE (>1.6): 1.9 CM2
BH CV ECHO MEAS - TR MAX VEL: 354 CM/SEC
BH CV XLRA - RV BASE: 3.9 CM
BH CV XLRA - RV LENGTH: 6.2 CM
BH CV XLRA - RV MID: 2.2 CM
BH CV XLRA - TDI S': 12 CM/SEC
BUN BLD-MCNC: 22 MG/DL (ref 8–23)
BUN/CREAT SERPL: 25.3 (ref 7–25)
CALCIUM SPEC-SCNC: 8.9 MG/DL (ref 8.6–10.5)
CHLORIDE SERPL-SCNC: 106 MMOL/L (ref 98–107)
CO2 SERPL-SCNC: 22.7 MMOL/L (ref 22–29)
CREAT BLD-MCNC: 0.87 MG/DL (ref 0.57–1)
DEPRECATED RDW RBC AUTO: 48.2 FL (ref 37–54)
E/E' RATIO: 32
EOSINOPHIL # BLD AUTO: 0.25 10*3/MM3 (ref 0–0.7)
EOSINOPHIL NFR BLD AUTO: 3.9 % (ref 0.3–6.2)
ERYTHROCYTE [DISTWIDTH] IN BLOOD BY AUTOMATED COUNT: 16.2 % (ref 11.7–13)
FERRITIN SERPL-MCNC: 7.71 NG/ML (ref 13–150)
FOLATE SERPL-MCNC: 5.33 NG/ML (ref 4.78–24.2)
GFR SERPL CREATININE-BSD FRML MDRD: 64 ML/MIN/1.73
GLUCOSE BLD-MCNC: 119 MG/DL (ref 65–99)
HCT VFR BLD AUTO: 32.2 % (ref 35.6–45.5)
HEMOCCULT STL QL: NEGATIVE
HGB BLD-MCNC: 9.5 G/DL (ref 11.9–15.5)
IMM GRANULOCYTES # BLD: 0 10*3/MM3 (ref 0–0.03)
IMM GRANULOCYTES NFR BLD: 0 % (ref 0–0.5)
INR PPP: 1 (ref 0.9–1.1)
IRON 24H UR-MRATE: 17 MCG/DL (ref 37–145)
IRON SATN MFR SERPL: 3 % (ref 20–50)
LEFT ATRIUM VOLUME INDEX: 52 ML/M2
LEFT ATRIUM VOLUME: 84 CM3
LV EF 2D ECHO EST: 60 %
LYMPHOCYTES # BLD AUTO: 1.76 10*3/MM3 (ref 0.9–4.8)
LYMPHOCYTES NFR BLD AUTO: 27.5 % (ref 19.6–45.3)
MCH RBC QN AUTO: 24 PG (ref 26.9–32)
MCHC RBC AUTO-ENTMCNC: 29.5 G/DL (ref 32.4–36.3)
MCV RBC AUTO: 81.3 FL (ref 80.5–98.2)
MONOCYTES # BLD AUTO: 0.72 10*3/MM3 (ref 0.2–1.2)
MONOCYTES NFR BLD AUTO: 11.3 % (ref 5–12)
NEUTROPHILS # BLD AUTO: 3.59 10*3/MM3 (ref 1.9–8.1)
NEUTROPHILS NFR BLD AUTO: 56.2 % (ref 42.7–76)
PLATELET # BLD AUTO: 309 10*3/MM3 (ref 140–500)
PMV BLD AUTO: 9.8 FL (ref 6–12)
POTASSIUM BLD-SCNC: 4.2 MMOL/L (ref 3.5–5.2)
PROTHROMBIN TIME: 12.8 SECONDS (ref 11.7–14.2)
RBC # BLD AUTO: 3.96 10*6/MM3 (ref 3.9–5.2)
SODIUM BLD-SCNC: 140 MMOL/L (ref 136–145)
TIBC SERPL-MCNC: 547 MCG/DL (ref 298–536)
TRANSFERRIN SERPL-MCNC: 367 MG/DL (ref 200–360)
VIT B12 BLD-MCNC: 413 PG/ML (ref 211–946)
WBC NRBC COR # BLD: 6.39 10*3/MM3 (ref 4.5–10.7)

## 2017-06-10 PROCEDURE — 83540 ASSAY OF IRON: CPT | Performed by: NURSE PRACTITIONER

## 2017-06-10 PROCEDURE — 82607 VITAMIN B-12: CPT | Performed by: NURSE PRACTITIONER

## 2017-06-10 PROCEDURE — 82728 ASSAY OF FERRITIN: CPT | Performed by: NURSE PRACTITIONER

## 2017-06-10 PROCEDURE — 25010000002 PROPOFOL 10 MG/ML EMULSION: Performed by: ANESTHESIOLOGY

## 2017-06-10 PROCEDURE — 0DB68ZX EXCISION OF STOMACH, VIA NATURAL OR ARTIFICIAL OPENING ENDOSCOPIC, DIAGNOSTIC: ICD-10-PCS | Performed by: INTERNAL MEDICINE

## 2017-06-10 PROCEDURE — 80048 BASIC METABOLIC PNL TOTAL CA: CPT | Performed by: NURSE PRACTITIONER

## 2017-06-10 PROCEDURE — 88305 TISSUE EXAM BY PATHOLOGIST: CPT | Performed by: INTERNAL MEDICINE

## 2017-06-10 PROCEDURE — 0DB98ZX EXCISION OF DUODENUM, VIA NATURAL OR ARTIFICIAL OPENING ENDOSCOPIC, DIAGNOSTIC: ICD-10-PCS | Performed by: INTERNAL MEDICINE

## 2017-06-10 PROCEDURE — 99232 SBSQ HOSP IP/OBS MODERATE 35: CPT | Performed by: INTERNAL MEDICINE

## 2017-06-10 PROCEDURE — 82746 ASSAY OF FOLIC ACID SERUM: CPT | Performed by: NURSE PRACTITIONER

## 2017-06-10 PROCEDURE — 88312 SPECIAL STAINS GROUP 1: CPT | Performed by: INTERNAL MEDICINE

## 2017-06-10 PROCEDURE — 84466 ASSAY OF TRANSFERRIN: CPT | Performed by: NURSE PRACTITIONER

## 2017-06-10 PROCEDURE — 85025 COMPLETE CBC W/AUTO DIFF WBC: CPT | Performed by: NURSE PRACTITIONER

## 2017-06-10 PROCEDURE — 82270 OCCULT BLOOD FECES: CPT | Performed by: NURSE PRACTITIONER

## 2017-06-10 PROCEDURE — 85610 PROTHROMBIN TIME: CPT | Performed by: NURSE PRACTITIONER

## 2017-06-10 PROCEDURE — 43239 EGD BIOPSY SINGLE/MULTIPLE: CPT | Performed by: INTERNAL MEDICINE

## 2017-06-10 RX ORDER — LIDOCAINE HYDROCHLORIDE 20 MG/ML
INJECTION, SOLUTION INFILTRATION; PERINEURAL AS NEEDED
Status: DISCONTINUED | OUTPATIENT
Start: 2017-06-10 | End: 2017-06-10 | Stop reason: SURG

## 2017-06-10 RX ORDER — PROPOFOL 10 MG/ML
VIAL (ML) INTRAVENOUS CONTINUOUS PRN
Status: DISCONTINUED | OUTPATIENT
Start: 2017-06-10 | End: 2017-06-10 | Stop reason: SURG

## 2017-06-10 RX ORDER — LIDOCAINE HYDROCHLORIDE 10 MG/ML
0.5 INJECTION, SOLUTION INFILTRATION; PERINEURAL ONCE AS NEEDED
Status: DISCONTINUED | OUTPATIENT
Start: 2017-06-10 | End: 2017-06-10

## 2017-06-10 RX ORDER — SODIUM CHLORIDE 0.9 % (FLUSH) 0.9 %
3 SYRINGE (ML) INJECTION AS NEEDED
Status: DISCONTINUED | OUTPATIENT
Start: 2017-06-10 | End: 2017-06-10

## 2017-06-10 RX ORDER — SODIUM CHLORIDE, SODIUM LACTATE, POTASSIUM CHLORIDE, CALCIUM CHLORIDE 600; 310; 30; 20 MG/100ML; MG/100ML; MG/100ML; MG/100ML
1000 INJECTION, SOLUTION INTRAVENOUS CONTINUOUS PRN
Status: DISCONTINUED | OUTPATIENT
Start: 2017-06-10 | End: 2017-06-10

## 2017-06-10 RX ORDER — SODIUM CHLORIDE 0.9 % (FLUSH) 0.9 %
1-10 SYRINGE (ML) INJECTION AS NEEDED
Status: DISCONTINUED | OUTPATIENT
Start: 2017-06-10 | End: 2017-06-10

## 2017-06-10 RX ADMIN — SODIUM CHLORIDE, POTASSIUM CHLORIDE, SODIUM LACTATE AND CALCIUM CHLORIDE 1000 ML: 600; 310; 30; 20 INJECTION, SOLUTION INTRAVENOUS at 09:26

## 2017-06-10 RX ADMIN — HYDRALAZINE HYDROCHLORIDE 50 MG: 50 TABLET, FILM COATED ORAL at 13:46

## 2017-06-10 RX ADMIN — PANTOPRAZOLE SODIUM 40 MG: 40 TABLET, DELAYED RELEASE ORAL at 11:20

## 2017-06-10 RX ADMIN — HYDRALAZINE HYDROCHLORIDE 50 MG: 50 TABLET, FILM COATED ORAL at 06:41

## 2017-06-10 RX ADMIN — HYDRALAZINE HYDROCHLORIDE 50 MG: 50 TABLET, FILM COATED ORAL at 23:01

## 2017-06-10 RX ADMIN — ASPIRIN 81 MG: 81 TABLET ORAL at 11:21

## 2017-06-10 RX ADMIN — LIDOCAINE HYDROCHLORIDE 40 MG: 20 INJECTION, SOLUTION INFILTRATION; PERINEURAL at 10:18

## 2017-06-10 RX ADMIN — LEVOTHYROXINE SODIUM 150 MCG: 150 TABLET ORAL at 06:53

## 2017-06-10 RX ADMIN — ATORVASTATIN CALCIUM 10 MG: 10 TABLET, FILM COATED ORAL at 20:47

## 2017-06-10 RX ADMIN — POTASSIUM CHLORIDE 20 MEQ: 750 CAPSULE, EXTENDED RELEASE ORAL at 11:20

## 2017-06-10 RX ADMIN — POTASSIUM CHLORIDE 20 MEQ: 750 CAPSULE, EXTENDED RELEASE ORAL at 18:18

## 2017-06-10 RX ADMIN — PANTOPRAZOLE SODIUM 40 MG: 40 TABLET, DELAYED RELEASE ORAL at 18:18

## 2017-06-10 RX ADMIN — ESTRADIOL 1 MG: 1 TABLET ORAL at 11:21

## 2017-06-10 RX ADMIN — PROPOFOL 150 MCG/KG/MIN: 10 INJECTION, EMULSION INTRAVENOUS at 10:19

## 2017-06-10 RX ADMIN — METOPROLOL TARTRATE 25 MG: 25 TABLET ORAL at 08:07

## 2017-06-10 NOTE — PROGRESS NOTES
" DAILY PROGRESS NOTE    CHIEF COMPLAINTS  DOING SAME  NO NEW COMPLAINTS      HISTORY OF PRESENT ILLNESS: This 71-year-old white female with known coronary artery disease, other medical problems, hypertension, hyperlipidemia, mitral valve repair, degenerative disk disease, pulmonary hypertension, hypothyroidism, gastroesophageal reflux disease, directly admitted to Cardiology service with substernal chest pain that she woke up with, with some shortness of breath. No nausea, vomiting, diaphoresis. No radiation. Lasted a few minutes and relieved by itself. She has known coronary artery disease. Admitted for further workup; at the time of interview she is pain-free. No fever or chills. No night sweats. No other complaint.    PHYSICAL EXAMINATION:Blood pressure 162/76, pulse 73, temperature 97.8 °F (36.6 °C), temperature source Oral, resp. rate 16, height 63\" (160 cm), weight 154 lb 3.2 oz (69.9 kg), SpO2 99 %.    GENERAL: Alert, oriented, no distress.   HEENT: Unremarkable.  NECK: Supple.  LUNGS: Clear.  HEART: S1, S2.   ABDOMEN: Soft, nontender. Bowel sounds positive.  EXTREMITIES: No edema.  NEUROLOGIC: No focal deficit.     DIAGNOSTIC DATA:  Lab Results (last 24 hours)     Procedure Component Value Units Date/Time    CBC & Differential [[id number]] Collected:  06/10/17 0506    Specimen:  Blood Updated:  06/10/17 0624    Narrative:       The following orders were created for panel order CBC & Differential.  Procedure                               Abnormality         Status                     ---------                               -----------         ------                     CBC Auto Differential[[id number]]        Abnormal            Final result                 Please view results for these tests on the individual orders.    CBC Auto Differential [[id number]]  (Abnormal) Collected:  06/10/17 0506    Specimen:  Blood Updated:  06/10/17 0624     WBC 6.39 10*3/mm3      RBC 3.96 10*6/mm3      Hemoglobin 9.5 (L) g/dL     "  Hematocrit 32.2 (L) %      MCV 81.3 fL      MCH 24.0 (L) pg      MCHC 29.5 (L) g/dL      RDW 16.2 (H) %      RDW-SD 48.2 fl      MPV 9.8 fL      Platelets 309 10*3/mm3      Neutrophil % 56.2 %      Lymphocyte % 27.5 %      Monocyte % 11.3 %      Eosinophil % 3.9 %      Basophil % 1.1 %      Immature Grans % 0.0 %      Neutrophils, Absolute 3.59 10*3/mm3      Lymphocytes, Absolute 1.76 10*3/mm3      Monocytes, Absolute 0.72 10*3/mm3      Eosinophils, Absolute 0.25 10*3/mm3      Basophils, Absolute 0.07 10*3/mm3      Immature Grans, Absolute 0.00 10*3/mm3     Protime-INR [[id number]]  (Normal) Collected:  06/10/17 0506    Specimen:  Blood Updated:  06/10/17 0640     Protime 12.8 Seconds      INR 1.00    Folate [[id number]]  (Normal) Collected:  06/10/17 0506    Specimen:  Blood Updated:  06/10/17 0658     Folate 5.33 ng/mL     Vitamin B12 [[id number]]  (Normal) Collected:  06/10/17 0506    Specimen:  Blood Updated:  06/10/17 0658     Vitamin B-12 413 pg/mL     Iron Profile [[id number]]  (Abnormal) Collected:  06/10/17 0506    Specimen:  Blood Updated:  06/10/17 0700     Iron 17 (L) mcg/dL      Iron Saturation 3 (L) %      Transferrin 367 (H) mg/dL      TIBC 547 mcg/dL     Basic Metabolic Panel [[id number]]  (Abnormal) Collected:  06/10/17 0506    Specimen:  Blood Updated:  06/10/17 0700     Glucose 119 (H) mg/dL      BUN 22 mg/dL      Creatinine 0.87 mg/dL      Sodium 140 mmol/L      Potassium 4.2 mmol/L      Chloride 106 mmol/L      CO2 22.7 mmol/L      Calcium 8.9 mg/dL      eGFR Non African Amer 64 mL/min/1.73      BUN/Creatinine Ratio 25.3 (H)     Anion Gap 11.3 mmol/L     Narrative:       The MDRD GFR formula is only valid for adults with stable renal function between ages 18 and 70.    Ferritin [[id number]]  (Abnormal) Collected:  06/10/17 0506    Specimen:  Blood Updated:  06/10/17 0701     Ferritin 7.71 (L) ng/mL     Occult Blood X 1, Stool [[id number]]  (Normal) Collected:  06/10/17 0436    Specimen:  Stool  Updated:  06/10/17 0724     Fecal Occult Blood Negative       Potassium 4.2, glucose 116, . Hemoglobin 10.9. The rest of the CBC is normal. Chest x-ray showed no active disease. EKG showed sinus rhythm, LVH, nonspecific changes.   Imaging Results (last 72 hours)     Procedure Component Value Units Date/Time    XR Chest 1 View [[id number]] Collected:  06/07/17 1247     Updated:  06/07/17 1251    Narrative:       XR CHEST 1 VW-     HISTORY: Female who is 71 years-old,  chest pain     TECHNIQUE: Frontal view of the chest     COMPARISON: 5/4/2016     FINDINGS: Patient is rotated to the right. Heart size is borderline.  Sternotomy wires are present. Pulmonary vasculature is unremarkable. No  focal pulmonary consolidation, pleural effusion, or pneumothorax. No  acute osseous process.       Impression:       No evidence for acute pulmonary process. Follow-up as  clinical indications persist.     This report was finalized on 6/7/2017 12:48 PM by Dr. Evangelista Tyson MD.           Current Facility-Administered Medications:   •  acetaminophen (TYLENOL) tablet 650 mg, 650 mg, Oral, Q4H PRN, Gay Saldivar, APRN, 650 mg at 06/08/17 1129  •  acetaminophen-codeine (TYLENOL #3) 300-30 MG per tablet 1 tablet, 1 tablet, Oral, Q6H PRN, Gay Saldivar APRN, 1 tablet at 06/07/17 2048  •  aspirin EC tablet 81 mg, 81 mg, Oral, Daily, Gay Saldivar APRN, 81 mg at 06/10/17 1121  •  atorvastatin (LIPITOR) tablet 10 mg, 10 mg, Oral, Nightly, West Perkins MD, 10 mg at 06/09/17 2257  •  cyclobenzaprine (FLEXERIL) tablet 10 mg, 10 mg, Oral, TID PRN, Gay Saldivar APRN  •  enoxaparin (LOVENOX) syringe 40 mg, 40 mg, Subcutaneous, Daily, Gay Saldivar APRN, 40 mg at 06/08/17 1121  •  estradiol (ESTRACE) tablet 1 mg, 1 mg, Oral, Daily, Gay Saldivar, APRN, 1 mg at 06/10/17 1121  •  hydrALAZINE (APRESOLINE) tablet 50 mg, 50 mg, Oral, Q8H, Gay Saldivar APRN, 50 mg at 06/10/17 4298  •  levothyroxine (SYNTHROID, LEVOTHROID) tablet 150 mcg, 150  mcg, Oral, Q AM, Fabrizio Perkins MD, 150 mcg at 06/10/17 0653  •  meloxicam (MOBIC) tablet 15 mg, 15 mg, Oral, Daily PRN, Gay Saldivar, APRN, 15 mg at 06/07/17 2358  •  metoprolol tartrate (LOPRESSOR) tablet 25 mg, 25 mg, Oral, Daily, Gay Saldivar APRN, 25 mg at 06/10/17 0807  •  pantoprazole (PROTONIX) EC tablet 40 mg, 40 mg, Oral, BID AC, Fabrizio Perkins MD, 40 mg at 06/10/17 1120  •  potassium chloride (MICRO-K) CR capsule 20 mEq, 20 mEq, Oral, BID With Meals, Cathy Hua MD, 20 mEq at 06/10/17 1120  •  sodium chloride 0.9 % flush 1-10 mL, 1-10 mL, Intravenous, PRN, Gay Saldivar, APRN  •  technetium sestamibi (CARDIOLITE) injection 1 dose, 1 dose, Intravenous, Once in imaging, Cathy Hua MD     STRESS TEST NO ISCHEMIA    CARDIAC CATH WNL  ASSESSMENT:  1.  Chest pain with known coronary artery disease, ATYPICAL  2.  Hypertension.  3.  Hyperlipidemia.  4.  Hypothyroidism.WITH HIGH TSH  5.  Gastroesophageal reflux disease.   6.  Degenerative disk disease.       PLAN  CPM  STRESS TEST NEDATIVE/ CARDIAC CATH WNL  EGD WNL  ADJUST MEDS  SUPPORTIVE CARE  Stress ulcer and DVT prophylaxis.   WILL FOLLOW      FABRIZIO PERKINS MD

## 2017-06-10 NOTE — ANESTHESIA PREPROCEDURE EVALUATION
Anesthesia Evaluation     Patient summary reviewed and Nursing notes reviewed   no history of anesthetic complications:  NPO Solid Status: > 6 hours  NPO Liquid Status: > 6 hours     Airway   Mallampati: II  TM distance: >3 FB  Neck ROM: full  no difficulty expected  Dental - normal exam     Pulmonary - negative pulmonary ROS and normal exam    breath sounds clear to auscultation  (-) rhonchi, decreased breath sounds, wheezes, rales, stridor  Cardiovascular - normal exam    Rhythm: regular  Rate: normal    (+) hypertension well controlled, valvular problems/murmurs, past MI  >12 months, CAD, angina, hyperlipidemia  (-) murmur, weak pulses, friction rub, systolic click, carotid bruits, JVD, peripheral edema      Neuro/Psych- negative ROS  GI/Hepatic/Renal/Endo    (+)  GERD well controlled, hypothyroidism,     Musculoskeletal (-) negative ROS    Abdominal  - normal exam    Abdomen: soft.   Substance History - negative use     OB/GYN negative ob/gyn ROS         Other      history of cancer remission                                    Anesthesia Plan    ASA 3     MAC     intravenous induction   Anesthetic plan and risks discussed with patient.

## 2017-06-10 NOTE — PLAN OF CARE
Problem: Patient Care Overview (Adult)  Goal: Plan of Care Review  Outcome: Ongoing (interventions implemented as appropriate)    06/10/17 0620   Coping/Psychosocial Response Interventions   Plan Of Care Reviewed With patient   Patient Care Overview   Progress improving   Outcome Evaluation   Outcome Summary/Follow up Plan No major changes with pt overnight, VS stable, right radial cath site dry and intact, no concerns noted with it, pt scheduled for EGD today, kept NPO after midnight       Goal: Adult Individualization and Mutuality  Outcome: Ongoing (interventions implemented as appropriate)  Goal: Discharge Needs Assessment  Outcome: Ongoing (interventions implemented as appropriate)    Problem: Pain, Acute (Adult)  Goal: Identify Related Risk Factors and Signs and Symptoms  Outcome: Outcome(s) achieved Date Met:  06/10/17  Goal: Acceptable Pain Control/Comfort Level  Outcome: Ongoing (interventions implemented as appropriate)    Problem: Cardiac Catheterization with/without PCI (Adult)  Goal: Signs and Symptoms of Listed Potential Problems Will be Absent or Manageable (Cardiac Catheterization with/without PCI)  Outcome: Ongoing (interventions implemented as appropriate)

## 2017-06-10 NOTE — BRIEF OP NOTE
ESOPHAGOGASTRODUODENOSCOPY  Procedure Note    Leslie Barber  6/7/2017 - 6/10/2017    Pre-op Diagnosis:   Anemia, unspecified type [D64.9]    Post-op Diagnosis:     Post-Op Diagnosis Codes:     * Anemia, unspecified type [D64.9]    Procedure/CPT® Codes:      Procedure(s):  ESOPHAGOGASTRODUODENOSCOPY WITH BIOPSIES    Surgeon(s):  Alfred Brumfield MD    Anesthesia: Monitor Anesthesia Care    Staff:   Endo Technician: Miracle Goldsmith  Endo Nurse: Jessica Marshall RN    Estimated Blood Loss: *No blood loss documented*  Urine Voided: * No values recorded between 6/10/2017 10:17 AM and 6/10/2017 10:29 AM *    Specimens:                  ID Type Source Tests Collected by Time Destination   A : DUODENAL BIOPSY Tissue Small Intestine, Duodenum TISSUE EXAM Alfred Brumfield MD 6/10/2017 1025    B : GASTRIC BIOPSY Tissue Stomach TISSUE EXAM Alfred Brumfield MD 6/10/2017 1027        Findings:  Mild post-ulcer deformity of 1st part of duodenum, otherwise normal EGD    Recommendations:  Discussed colonoscopy with pt put she would like to defer for now and consider outpt evaluation  Given her ALTAGRACIA with negative FOBT, would consider hematology consult for further evaluation    Complications: None      Alfred Brumfield MD     Date: 6/10/2017  Time: 10:55 AM

## 2017-06-10 NOTE — ANESTHESIA POSTPROCEDURE EVALUATION
Patient: Leslie Barber    Procedure Summary     Date Anesthesia Start Anesthesia Stop Room / Location    06/10/17 1015 1032  [facility] /  [facility]       Procedure Diagnosis Surgeon Provider    ESOPHAGOGASTRODUODENOSCOPY WITH BIOPSIES (N/A Esophagus) Anemia, unspecified type  (Anemia, unspecified type [D64.9]) MD Parish Garsia MD          Anesthesia Type: MAC  Last vitals  BP      Temp      Pulse     Resp      SpO2        Post Anesthesia Care and Evaluation    Patient location during evaluation: PACU  Patient participation: complete - patient participated  Level of consciousness: awake and alert  Pain management: adequate  Airway patency: patent  Anesthetic complications: No anesthetic complications    Cardiovascular status: acceptable  Respiratory status: acceptable  Hydration status: acceptable

## 2017-06-10 NOTE — PLAN OF CARE
Problem: Patient Care Overview (Adult)  Goal: Plan of Care Review  Outcome: Ongoing (interventions implemented as appropriate)    06/10/17 1524   Outcome Evaluation   Outcome Summary/Follow up Plan VSS. Went for EGD, tolerated the procedure. Advanced diet to heart healthy       06/10/17 1524   Outcome Evaluation   Outcome Summary/Follow up Plan VSS. Went for EGD, tolerated the procedure. Advanced diet to heart healthy. Possible d/c tomorrow. Will Continue to monitor.   . Possible d/c tomorrow. Will Continue to monitor.          Goal: Adult Individualization and Mutuality  Outcome: Ongoing (interventions implemented as appropriate)  Goal: Discharge Needs Assessment  Outcome: Ongoing (interventions implemented as appropriate)    Problem: Pain, Acute (Adult)  Goal: Acceptable Pain Control/Comfort Level  Outcome: Ongoing (interventions implemented as appropriate)    Problem: Cardiac Catheterization with/without PCI (Adult)  Goal: Signs and Symptoms of Listed Potential Problems Will be Absent or Manageable (Cardiac Catheterization with/without PCI)  Outcome: Outcome(s) achieved Date Met:  06/10/17

## 2017-06-10 NOTE — PLAN OF CARE
Problem: GI Endoscopy (Adult)  Intervention: Monitor/Manage Procedure Recovery    06/10/17 0917   Respiratory Interventions   Airway/Ventilation Management airway patency maintained   Coping/Psychosocial Interventions   Environmental Support calm environment promoted   Activity   Activity Type activity adjusted per tolerance   Cardiac Interventions   Warming Thermoregulation Maintenance warm blankets applied         Goal: Signs and Symptoms of Listed Potential Problems Will be Absent or Manageable (GI Endoscopy)  Outcome: Ongoing (interventions implemented as appropriate)    06/10/17 0917   GI Endoscopy   Problems Assessed (GI Endoscopy) pain   Problems Present (GI Endoscopy) none

## 2017-06-10 NOTE — PROGRESS NOTES
PROGRESS NOTE   LOS: 1 day   Patient Care Team:  Renetta Plascencia MD as PCP - General (Internal Medicine)  Jamir Lynch MD as Consulting Physician (Neurosurgery)    Chief Complaint: Shortness of breath, fatigue, chest discomfort    Interval History: Patient was admitted on 6/7/17 with complaints of chest pain, shortness of breath and fatigue.  He has a history of peptic ulcer disease with prior GI bleeding.  She was evaluated by gastroenterology due to worsening iron deficiency anemia.  She had a negative fecal occult blood stool.  She is status post EGD on 6/10/17 with mild post ulcer deformity of duodenum and tissue biopsy of duodenal and gastric areas were sent.  She reports that her breathing is improved from yesterday.  He has not required any blood transfusion.  Chest x-ray showed no acute pulmonary process.  Patient had a stress test on 6/8/17 that showed an EF 50% with medium-sized infarct in the lateral wall with no ischemia noted.  She underwent cardiac catheterization on 6/9/17 that showed normal coronary arteries.  Final echocardiogram report is still pending although RVSP was elevated at 53 mmHg with a history of moderate tricuspid regurgitation with prior RVSP of 45 mmHg on echo 5/5/2016.      REVIEW OF SYSTEMS:   CARDIOVASCULAR: No chest pain, chest pressure or chest discomfort. No palpitations or edema.   RESPIRATORY: No shortness of breath, cough or sputum.   GASTROINTESTINAL: No anorexia, nausea, vomiting or diarrhea. No abdominal pain or blood.   HEMATOLOGIC: No bleeding or bruising.     Ventilator/Non-Invasive Ventilation Settings     None          Vital Signs  Temp:  [97.5 °F (36.4 °C)-98 °F (36.7 °C)] 97.8 °F (36.6 °C)  Heart Rate:  [67-84] 73  Resp:  [16-18] 16  BP: (113-163)/(51-88) 162/76  SpO2:  [96 %-99 %] 99 %  on  Flow (L/min):  [4] 4 O2 Device: room air    Intake/Output Summary (Last 24 hours) at 06/10/17 1414  Last data filed at 06/10/17 1027   Gross per 24 hour   Intake       "        200 ml   Output                0 ml   Net              200 ml     Flowsheet Rows         First Filed Value    Admission Height  63\" (160 cm) Documented at 06/07/2017 1201    Admission Weight  152 lb (68.9 kg) Documented at 06/07/2017 1201        Last 3 weights    06/09/17  0614 06/09/17  0700 06/10/17  0632   Weight: 152 lb (68.9 kg) 152 lb 3.2 oz (69 kg) 154 lb 3.2 oz (69.9 kg)       Physical Exam:  GEN:  No acute distress, alert, cooperative, well developed. on room air  EYES:   Sclera clear. No icterus. PERRL.   ENT:   External ears/nose normal, no oral lesions, no thrush, mucous membranes moist  NECK:  Supple, midline trachea, no JVD  LUNGS: Normal chest on inspection, CTAB, no wheezes. No rhonchi. No crackles. Respirations regular, even and unlabored.   CV:  Regular rhythm and rate. Normal S1/S2. No murmurs, gallops, or rubs noted.  ABD:  Soft, non-tender and non-distended. Normal bowel sounds. No guarding  EXT:  Moves all extremities well. No cyanosis. No redness. No edema.   Skin: dry, intact, no bleeding    Results Review:        Results from last 7 days  Lab Units 06/10/17  0506 06/09/17  0516 06/08/17  0516 06/07/17  1206   SODIUM mmol/L 140 138 138 140   POTASSIUM mmol/L 4.2 4.2 4.1 4.2   CHLORIDE mmol/L 106 104 104 103   TOTAL CO2 mmol/L 22.7 22.9 21.7* 20.1*   BUN mg/dL 22 22 19 21   CREATININE mg/dL 0.87 0.94 0.81 0.85   CALCIUM mg/dL 8.9 9.1 8.7 9.9   AST (SGOT) U/L  --   --  16 24   ALT (SGPT) U/L  --   --  10 16   ANION GAP mmol/L 11.3 11.1 12.3 16.9   ALBUMIN g/dL  --   --  3.60 4.40       Results from last 7 days  Lab Units 06/07/17  2356 06/07/17  1807 06/07/17  1206   TROPONIN T ng/mL <0.010 <0.010 <0.010       Results from last 7 days  Lab Units 06/10/17  0506 06/09/17  0516 06/08/17  0516 06/07/17  1206   WBC 10*3/mm3 6.39 6.08 6.26 6.53   HEMOGLOBIN g/dL 9.5* 9.0* 8.9* 10.9*   HEMATOCRIT % 32.2* 30.6* 29.8* 36.6   PLATELETS 10*3/mm3 309 301 304 368   NEUTROPHIL % % 56.2 55.1 55.2 63.0 "   LYMPHOCYTE % % 27.5 27.8 29.4 23.6   MONOCYTES % % 11.3 10.7 10.1 8.9   EOSINOPHIL % % 3.9 4.8 4.0 3.1   BASOPHIL % % 1.1 1.6* 1.3 1.1   IMM GRAN % % 0.0 0.0 0.0 0.3       Results from last 7 days  Lab Units 06/10/17  0506 06/09/17  0516 06/07/17  1206   INR  1.00 0.97 0.96       Results from last 7 days  Lab Units 06/09/17  0516 06/07/17  1206   MAGNESIUM mg/dL 2.3 2.4       Results from last 7 days  Lab Units 06/07/17  1206   CHOLESTEROL mg/dL 218*   TRIGLYCERIDES mg/dL 137   HDL CHOL mg/dL 90*   LDL CHOL mg/dL 101*           Results from last 7 days  Lab Units 06/08/17  0516   HEMOGLOBIN A1C % 4.97     No results found for: POCGLU        Results from last 7 days  Lab Units 06/08/17  0516 06/07/17  1206   PROBNP pg/mL 578.9 912.8*        Results for KELVINFARHAT QUESADA (MRN [id number]) as of 6/10/2017 12:55   Ref. Range 6/10/2017 05:06   Iron Latest Ref Range: 37 - 145 mcg/dL 17 (L)   Ferritin Latest Ref Range: 13.00 - 150.00 ng/mL 7.71 (L)   Iron Saturation Latest Ref Range: 20 - 50 % 3 (L)   Transferrin Latest Ref Range: 200 - 360 mg/dL 367 (H)   TIBC Latest Ref Range: 298 - 536 mcg/dL 547   Folate Latest Ref Range: 4.78 - 24.20 ng/mL 5.33                   Imaging Results (all)     Procedure Component Value Units Date/Time    XR Chest 1 View [[id number]] Collected:  06/07/17 1247     Updated:  06/07/17 1251    Narrative:       XR CHEST 1 VW-     HISTORY: Female who is 71 years-old,  chest pain     TECHNIQUE: Frontal view of the chest     COMPARISON: 5/4/2016     FINDINGS: Patient is rotated to the right. Heart size is borderline.  Sternotomy wires are present. Pulmonary vasculature is unremarkable. No  focal pulmonary consolidation, pleural effusion, or pneumothorax. No  acute osseous process.       Impression:       No evidence for acute pulmonary process. Follow-up as  clinical indications persist.     This report was finalized on 6/7/2017 12:48 PM by Dr. Evangelista Tyson MD.             I reviewed the  patient's new clinical results.  I personally viewed and interpreted the patient's imaging results:        Medication Review:     aspirin 81 mg Oral Daily   atorvastatin 10 mg Oral Nightly   enoxaparin 40 mg Subcutaneous Daily   estradiol 1 mg Oral Daily   hydrALAZINE 50 mg Oral Q8H   levothyroxine 150 mcg Oral Q AM   metoprolol tartrate 25 mg Oral Daily   pantoprazole 40 mg Oral BID AC   potassium chloride 20 mEq Oral BID With Meals   technetium sestamibi 1 dose Intravenous Once in imaging            ASSESSMENT:   1. Dyspnea on exertion- improved  2. Atypical Chest pain  3. History of pulmonary hypertension with RVSP of 45 mmHg from echo 5/5/16  4. History of moderate tricuspid regurgitation  5. Iron deficiency anemia  6. History of peptic ulcer disease with post ulcer changes and duodenum from EGD on 6/10/17  7. History of mitral valve repair 2013  8. Hypertension   9. Hyperlipidemia  10. Hypothyroidism      PLAN:  If patient's pulmonary hypertension is worsening may consider right heart catheter to determine cardiac versus pulmonary causes and evaluate treatment options.  Can follow as an outpatient.    Patient is doing well status post EGD and reports that her breathing and shortness of breath have improved.  She is currently on room air with saturations greater than 95%.  She is stable from pulmonary standpoint and okay for discharge.  shortness of breath with exertion  is likely due to underlying anemia.  Encouraged to continue to use incentive spirometer especially after EGD procedure.    Disposition: Per admitting.     Honey Olvera MD  06/10/17  2:14 PM          EMR Dragon/Transcription disclaimer:   Much of this encounter note is an electronic transcription/translation of spoken language to printed text. The electronic translation of spoken language may permit erroneous, or at times, nonsensical words or phrases to be inadvertently transcribed; Although I have reviewed the note for such errors, some may  still exist.

## 2017-06-11 VITALS
DIASTOLIC BLOOD PRESSURE: 92 MMHG | TEMPERATURE: 97.6 F | HEART RATE: 80 BPM | SYSTOLIC BLOOD PRESSURE: 175 MMHG | BODY MASS INDEX: 26.61 KG/M2 | WEIGHT: 150.2 LBS | RESPIRATION RATE: 16 BRPM | OXYGEN SATURATION: 95 % | HEIGHT: 63 IN

## 2017-06-11 PROCEDURE — 99239 HOSP IP/OBS DSCHRG MGMT >30: CPT | Performed by: INTERNAL MEDICINE

## 2017-06-11 PROCEDURE — 25010000002 ENOXAPARIN PER 10 MG: Performed by: NURSE PRACTITIONER

## 2017-06-11 RX ORDER — FERROUS SULFATE 325(65) MG
325 TABLET ORAL
Qty: 30 TABLET | Refills: 1 | OUTPATIENT
Start: 2017-06-11

## 2017-06-11 RX ORDER — FERROUS SULFATE 325(65) MG
325 TABLET ORAL
Status: DISCONTINUED | OUTPATIENT
Start: 2017-06-11 | End: 2017-06-11 | Stop reason: HOSPADM

## 2017-06-11 RX ADMIN — LEVOTHYROXINE SODIUM 150 MCG: 150 TABLET ORAL at 06:51

## 2017-06-11 RX ADMIN — ASPIRIN 81 MG: 81 TABLET ORAL at 08:05

## 2017-06-11 RX ADMIN — ENOXAPARIN SODIUM 40 MG: 40 INJECTION SUBCUTANEOUS at 08:04

## 2017-06-11 RX ADMIN — HYDRALAZINE HYDROCHLORIDE 50 MG: 50 TABLET, FILM COATED ORAL at 06:51

## 2017-06-11 RX ADMIN — FERROUS SULFATE TAB 325 MG (65 MG ELEMENTAL FE) 325 MG: 325 (65 FE) TAB at 10:18

## 2017-06-11 RX ADMIN — METOPROLOL TARTRATE 25 MG: 25 TABLET ORAL at 08:04

## 2017-06-11 RX ADMIN — ESTRADIOL 1 MG: 1 TABLET ORAL at 08:05

## 2017-06-11 RX ADMIN — POTASSIUM CHLORIDE 20 MEQ: 750 CAPSULE, EXTENDED RELEASE ORAL at 08:05

## 2017-06-11 RX ADMIN — PANTOPRAZOLE SODIUM 40 MG: 40 TABLET, DELAYED RELEASE ORAL at 08:04

## 2017-06-11 RX ADMIN — HYDRALAZINE HYDROCHLORIDE 50 MG: 50 TABLET, FILM COATED ORAL at 13:31

## 2017-06-11 NOTE — PROGRESS NOTES
[facility]  Inpatient Progress Note    Reason for Follow Up:  Non-cardiac chest pain    Subjective     Interval History:   No new issues.  No current chest pain.     Current Facility-Administered Medications:   •  acetaminophen (TYLENOL) tablet 650 mg, 650 mg, Oral, Q4H PRN, Gay Saldivar, APRN, 650 mg at 06/08/17 1129  •  acetaminophen-codeine (TYLENOL #3) 300-30 MG per tablet 1 tablet, 1 tablet, Oral, Q6H PRN, Gay Saldivar APRN, 1 tablet at 06/07/17 2048  •  aspirin EC tablet 81 mg, 81 mg, Oral, Daily, Gay Saldivar APRN, 81 mg at 06/11/17 0805  •  atorvastatin (LIPITOR) tablet 10 mg, 10 mg, Oral, Nightly, West Perkins MD, 10 mg at 06/10/17 2047  •  cyclobenzaprine (FLEXERIL) tablet 10 mg, 10 mg, Oral, TID PRN, Gay Saldivar APRN  •  enoxaparin (LOVENOX) syringe 40 mg, 40 mg, Subcutaneous, Daily, Gay Saldivar, APRN, 40 mg at 06/11/17 0804  •  estradiol (ESTRACE) tablet 1 mg, 1 mg, Oral, Daily, Gay Saldivar APRN, 1 mg at 06/11/17 0805  •  hydrALAZINE (APRESOLINE) tablet 50 mg, 50 mg, Oral, Q8H, Gay Saldivar APRN, 50 mg at 06/11/17 0651  •  levothyroxine (SYNTHROID, LEVOTHROID) tablet 150 mcg, 150 mcg, Oral, Q AM, West Perkins MD, 150 mcg at 06/11/17 0651  •  meloxicam (MOBIC) tablet 15 mg, 15 mg, Oral, Daily PRN, Gay Saldivar, APRN, 15 mg at 06/07/17 2358  •  metoprolol tartrate (LOPRESSOR) tablet 25 mg, 25 mg, Oral, Daily, Gay Saldivar, APRN, 25 mg at 06/11/17 0804  •  pantoprazole (PROTONIX) EC tablet 40 mg, 40 mg, Oral, BID AC, West Perkins MD, 40 mg at 06/11/17 0804  •  potassium chloride (MICRO-K) CR capsule 20 mEq, 20 mEq, Oral, BID With Meals, Cathy Hua MD, 20 mEq at 06/11/17 0805  •  sodium chloride 0.9 % flush 1-10 mL, 1-10 mL, Intravenous, PRN, MODESTO Jc  •  technetium sestamibi (CARDIOLITE) injection 1 dose, 1 dose, Intravenous, Once in imaging, Cathy Hua MD  Review of Systems:    The following systems were reviewed and negative;  constitution  and gastrointestinal    Objective     Vital Signs  Temp:  [97.5 °F (36.4 °C)-97.8 °F (36.6 °C)] 97.7 °F (36.5 °C)  Heart Rate:  [67-90] 90  Resp:  [16] 16  BP: (113-162)/(51-81) 152/74  Body mass index is 26.61 kg/(m^2).    Intake/Output Summary (Last 24 hours) at 06/11/17 0818  Last data filed at 06/10/17 1027   Gross per 24 hour   Intake              200 ml   Output                0 ml   Net              200 ml          Physical Exam:   General: patient awake, alert and cooperative   Abdomen: soft, nontender, nondistended; normal bowel sounds   Psychiatric: Normal mood and behavior; memory intact     Results Review:     I reviewed the patient's new clinical results.      Results from last 7 days  Lab Units 06/10/17  0506 06/09/17  0516 06/08/17  0516   WBC 10*3/mm3 6.39 6.08 6.26   HEMOGLOBIN g/dL 9.5* 9.0* 8.9*   HEMATOCRIT % 32.2* 30.6* 29.8*   PLATELETS 10*3/mm3 309 301 304       Results from last 7 days  Lab Units 06/10/17  0506 06/09/17  0516 06/08/17  0516 06/07/17  1206   SODIUM mmol/L 140 138 138 140   POTASSIUM mmol/L 4.2 4.2 4.1 4.2   CHLORIDE mmol/L 106 104 104 103   TOTAL CO2 mmol/L 22.7 22.9 21.7* 20.1*   BUN mg/dL 22 22 19 21   CREATININE mg/dL 0.87 0.94 0.81 0.85   CALCIUM mg/dL 8.9 9.1 8.7 9.9   BILIRUBIN mg/dL  --   --  0.2 0.3   ALK PHOS U/L  --   --  67 92   ALT (SGPT) U/L  --   --  10 16   AST (SGOT) U/L  --   --  16 24   GLUCOSE mg/dL 119* 107* 102* 116*         Results from last 7 days  Lab Units 06/10/17  0506 06/09/17  0516 06/07/17  1206   INR  1.00 0.97 0.96       No results found for: LIPASE    Radiology:    Imaging Results (last 24 hours)     ** No results found for the last 24 hours. 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 Non-cardiac chest pain  2. Iron deficiency anemia with negative FOBT    Recommendations:  EGD yesterday relatively unrevealing with regards to potential cause of chest pain  Await biopsies  Continue once/day PPI  Even though FOBT is negative, we did discuss inpt colonoscopy for evaluation of her ALTAGRACIA.  Pt would like to defer this and consider outpt evaluation.      I will start oral iron supplement and have pt f/u in office with Dr. Jules to discuss eventual lower endoscopic evaluation.   OK for DC from GI standpoint.

## 2017-06-11 NOTE — PROGRESS NOTES
[facility]  Cardiology Progress Note    Patient Identification:  Name: Leslie Barber  Age: 71 y.o.  Sex: female  :  1945  MRN: [id number]                 Follow Up / Chief Complaint: Chest pain, history of moderate one-vessel CAD , history of MV repair, hypertension, dyslipidemia    Interval History:  Patient seen in office yesterday reporting a several month history of recurrent chest pain     Subjective:    Still have sig cp, palpitation, syncope near syncope      Objective:  CE neg x3   -160s/90s   with normal pulmonary vasculature on chest x-ray   TSH 21.17.  Mixed dyslipidemia - not at goal on current dose of statin    Past Medical History:  Past Medical History:   Diagnosis Date   • Cataract    • Chest pain    • Coronary artery disease     CHRONIC   • GERD (gastroesophageal reflux disease)    • Hyperlipidemia    • Hypertension    • Mitral valve disease    • Myocardial infarction    • Near syncope    • Pain in extremity      Past Surgical History:  Past Surgical History:   Procedure Laterality Date   • APPENDECTOMY     • CERVICAL SPINE SURGERY     • COLONOSCOPY     • ENDOSCOPY     • HYSTERECTOMY     • MITRAL VALVE REPAIR/REPLACEMENT          Social History:   Social History   Substance Use Topics   • Smoking status: Never Smoker   • Smokeless tobacco: Never Used   • Alcohol use No      Family History:  Family History   Problem Relation Age of Onset   • Hypertension Mother    • Heart disease Mother    • Hypertension Father    • Heart disease Father           Allergies:  Allergies   Allergen Reactions   • Olmesartan Other (See Comments)     HEADACHES   • Lisinopril    • Losartan Potassium-Hctz    • Other      ANIMALS - CATS & DOG   • Tree Extract      Scheduled Meds:    aspirin 81 mg Daily   atorvastatin 10 mg Nightly   enoxaparin 40 mg Daily   estradiol 1 mg Daily   hydrALAZINE 50 mg Q8H   levothyroxine 150 mcg Q AM   metoprolol tartrate 25 mg Daily   pantoprazole 40  "mg BID AC   potassium chloride 20 mEq BID With Meals   technetium sestamibi 1 dose Once in imaging           INTAKE AND OUTPUT:    Intake/Output Summary (Last 24 hours) at 06/10/17 2147  Last data filed at 06/10/17 1027   Gross per 24 hour   Intake              200 ml   Output                0 ml   Net              200 ml       Review of Systems:   GI:  NO NAUSEA, VOMITTING  Cardiac:  NO CP, PALPITATION  Pulmonary: NO COUGH, SPUTUM    Constitutional:  Temp:  [97.5 °F (36.4 °C)-97.8 °F (36.6 °C)] 97.5 °F (36.4 °C)  Heart Rate:  [67-84] 78  Resp:  [16-18] 16  BP: (113-162)/(51-81) 136/81    Physical Exam: Performed by Dr Hua             Physical Exam  /81 (BP Location: Right arm, Patient Position: Lying)  Pulse 78  Temp 97.5 °F (36.4 °C) (Oral)   Resp 16  Ht 63\" (160 cm)  Wt 154 lb 3.2 oz (69.9 kg)  SpO2 98%  BMI 27.32 kg/m2    General appearance: NAD, conversant   Eyes: anicteric sclerae, moist conjunctivae; no lid-lag; PERRLA   HENT: Atraumatic; oropharynx clear with moist mucous membranes and no mucosal ulcerations;  normal hard and soft palate   Neck: Trachea midline; FROM, supple, no thyromegaly or lymphadenopathy   Lungs: CTA, with normal respiratory effort and no intercostal retractions   CV: S1-S2 regular, no murmurs, no rub, no gallop   Abdomen: Soft, non-tender; no masses or HSM   Extremities: No peripheral edema or extremity lymphadenopathy  Skin: Normal temperature, turgor and texture; no rash, ulcers or subcutaneous nodules   Psych: Appropriate affect, alert and oriented to person, place and time         Cardiographics  Telemetry:       ECG 6--7-2017:       Echocardiogram: pending    Cath 2013  IMPRESSION OF HEART CATHETERIZATION:    1. Normal left main coronary artery.  2. Normal left anterior descending artery and its branches.  3. Normal left circumflex artery and its branches.  4. Normal right coronary artery.  5. Normal left ventricular systolic function.  LVEDP 25 " mmHg.  Ejection   fraction 50%  6. Moderate pulmonary hypertension.  7. 3+ mitral regurgitation    RECOMMENDATION:  Considering significant mitral regurgitation as well as  tricuspid regurgitation patient will be consider for the mitral valve and  tricuspid valve repair.        CXR FINDINGS: Patient is rotated to the right. Heart size is borderline. Sternotomy wires are present. Pulmonary vasculature is unremarkable. No  focal pulmonary consolidation, pleural effusion, or pneumothorax. No acute osseous process.      IMPRESSION:  No evidence for acute pulmonary process. Follow-up as  clinical indications persist.      Lab Review     Results from last 7 days  Lab Units 06/07/17  2356 06/07/17  1807 06/07/17  1206   TROPONIN T ng/mL <0.010 <0.010 <0.010       Results from last 7 days  Lab Units 06/09/17  0516   MAGNESIUM mg/dL 2.3       Results from last 7 days  Lab Units 06/10/17  0506   SODIUM mmol/L 140   POTASSIUM mmol/L 4.2   BUN mg/dL 22   CREATININE mg/dL 0.87   CALCIUM mg/dL 8.9       Results from last 7 days  Lab Units 06/10/17  0506 06/09/17  0516 06/08/17  0516   WBC 10*3/mm3 6.39 6.08 6.26   HEMOGLOBIN g/dL 9.5* 9.0* 8.9*   HEMATOCRIT % 32.2* 30.6* 29.8*   PLATELETS 10*3/mm3 309 301 304       Results from last 7 days  Lab Units 06/10/17  0506 06/09/17  0516 06/07/17  1206   INR  1.00 0.97 0.96         Assessment:  - Chest pain  - CAD - 1v CAD 5/2014 (50% RCA)  - h/o MV repair 2013  - HTN  - Dyslipidemia  - Hypothyroidism -> IM  - Abnormal TSH -> IM   TSH 21.17       Plan:  - Chest pain -  No MI. Cath 5/2014 =>50% RCA.    - h/o MV repair 2013 - 2-D echo pending    - HTN -  diastolic pressures running in the 90s  on Lopressor and bid Apresoline.  Will change Apresoline to 3 times a day    - Dyslipidemia - TC, LDL not at goal.  Statin dose has been increased.  Repeat LFTs and lipid panel recommended in 8-12 weeks      6/9    Review 2-D echo and stress test.  Will change Apresoline to 3 times a day  Defer  "management of abnormal TSH to internal medicine.  Lipitor dose is been increased.  Repeat LFTs and lipid panel recommended in 8-12 weeks      Labs/tests ordered for am: BMP      I reviewed the patient's new clinical results and treatment plan   I personally viewed and interpreted the patient's EKG/Telemetry data    STRESS TEST POSITIVE ,    Procedure, risks and options of cardiac cath explained to pt INCLUDING BUT NOT LIMITED TO MI, STROKE, DEATH, INFECTION HAEMORRHAGE, . Pt understands well and agrees with no further questions.    6/10    Cardiac catheterization performed by Dr. Hua yesterday showed normal coronary arteries.  Therefore, the patient had a false positive stress test.  GI workup of atypical chest pain is in progress.  Also, there was no 50% narrowing of the right coronary artery as shown by catheterization in 2014 and a colon prep set was due to spasm.    Her blood pressure is well controlled for the most part.  No more moderate to severe hypertension.    Probably home tomorrow if okay with GI and internal medicine.        )6/10/2017  MD WINSTON Taylor/Transcription:   \"Dictated utilizing Dragon dictation\".     "

## 2017-06-11 NOTE — PLAN OF CARE
Problem: Patient Care Overview (Adult)  Goal: Plan of Care Review  Outcome: Ongoing (interventions implemented as appropriate)    06/11/17 0538   Coping/Psychosocial Response Interventions   Plan Of Care Reviewed With patient   Patient Care Overview   Progress improving   Outcome Evaluation   Outcome Summary/Follow up Plan No acute changes overnight with pt, VS stable, no new complaints or complaints of pain, pt expected to be discharged today       Goal: Adult Individualization and Mutuality  Outcome: Ongoing (interventions implemented as appropriate)  Goal: Discharge Needs Assessment  Outcome: Ongoing (interventions implemented as appropriate)    Problem: Pain, Acute (Adult)  Goal: Acceptable Pain Control/Comfort Level  Outcome: Outcome(s) achieved Date Met:  06/11/17

## 2017-06-11 NOTE — PROGRESS NOTES
" DAILY PROGRESS NOTE    CHIEF COMPLAINTS  DOING BETTER  NO MORE SOB  WANTS TO GO HOME      HISTORY OF PRESENT ILLNESS: This 71-year-old white female with known coronary artery disease, other medical problems, hypertension, hyperlipidemia, mitral valve repair, degenerative disk disease, pulmonary hypertension, hypothyroidism, gastroesophageal reflux disease, directly admitted to Cardiology service with substernal chest pain that she woke up with, with some shortness of breath. No nausea, vomiting, diaphoresis. No radiation. Lasted a few minutes and relieved by itself. She has known coronary artery disease. Admitted for further workup; at the time of interview she is pain-free. No fever or chills. No night sweats. No other complaint.    PHYSICAL EXAMINATION:Blood pressure 151/71, pulse 80, temperature 97.8 °F (36.6 °C), temperature source Oral, resp. rate 16, height 63\" (160 cm), weight 150 lb 3.2 oz (68.1 kg), SpO2 95 %.    GENERAL: Alert, oriented, no distress.   HEENT: Unremarkable.  NECK: Supple.  LUNGS: Clear.  HEART: S1, S2.   ABDOMEN: Soft, nontender. Bowel sounds positive.  EXTREMITIES: No edema.  NEUROLOGIC: No focal deficit.     DIAGNOSTIC DATA:  Lab Results (last 24 hours)     ** No results found for the last 24 hours. **       Potassium 4.2, glucose 116, . Hemoglobin 10.9. The rest of the CBC is normal. Chest x-ray showed no active disease. EKG showed sinus rhythm, LVH, nonspecific changes.   Imaging Results (last 72 hours)     Procedure Component Value Units Date/Time    XR Chest 1 View [[id number]] Collected:  06/07/17 1247     Updated:  06/07/17 1251    Narrative:       XR CHEST 1 VW-     HISTORY: Female who is 71 years-old,  chest pain     TECHNIQUE: Frontal view of the chest     COMPARISON: 5/4/2016     FINDINGS: Patient is rotated to the right. Heart size is borderline.  Sternotomy wires are present. Pulmonary vasculature is unremarkable. No  focal pulmonary consolidation, pleural effusion, or " pneumothorax. No  acute osseous process.       Impression:       No evidence for acute pulmonary process. Follow-up as  clinical indications persist.     This report was finalized on 6/7/2017 12:48 PM by Dr. Evangelista Tyson MD.           Current Facility-Administered Medications:   •  acetaminophen (TYLENOL) tablet 650 mg, 650 mg, Oral, Q4H PRN, Gay Saldivar, APRN, 650 mg at 06/08/17 1129  •  acetaminophen-codeine (TYLENOL #3) 300-30 MG per tablet 1 tablet, 1 tablet, Oral, Q6H PRN, Gay Saldivar APRN, 1 tablet at 06/07/17 2048  •  aspirin EC tablet 81 mg, 81 mg, Oral, Daily, Gay Saldivar APRN, 81 mg at 06/11/17 0805  •  atorvastatin (LIPITOR) tablet 10 mg, 10 mg, Oral, Nightly, West Perkins MD, 10 mg at 06/10/17 2047  •  cyclobenzaprine (FLEXERIL) tablet 10 mg, 10 mg, Oral, TID PRN, Gay Saldivar APRN  •  enoxaparin (LOVENOX) syringe 40 mg, 40 mg, Subcutaneous, Daily, Gay Saldivar APRN, 40 mg at 06/11/17 0804  •  estradiol (ESTRACE) tablet 1 mg, 1 mg, Oral, Daily, Gay Saldivar APRN, 1 mg at 06/11/17 0805  •  ferrous sulfate tablet 325 mg, 325 mg, Oral, Daily With Breakfast, Alfred Brumfield MD, 325 mg at 06/11/17 1018  •  hydrALAZINE (APRESOLINE) tablet 50 mg, 50 mg, Oral, Q8H, Gay Saldivar APRN, 50 mg at 06/11/17 0651  •  levothyroxine (SYNTHROID, LEVOTHROID) tablet 150 mcg, 150 mcg, Oral, Q AM, West Perkins MD, 150 mcg at 06/11/17 0651  •  meloxicam (MOBIC) tablet 15 mg, 15 mg, Oral, Daily PRN, Gay Saldivar APRN, 15 mg at 06/07/17 2358  •  metoprolol tartrate (LOPRESSOR) tablet 25 mg, 25 mg, Oral, Daily, Gay Saldivar APRN, 25 mg at 06/11/17 0804  •  pantoprazole (PROTONIX) EC tablet 40 mg, 40 mg, Oral, BID AC, West Perkins MD, 40 mg at 06/11/17 0804  •  potassium chloride (MICRO-K) CR capsule 20 mEq, 20 mEq, Oral, BID With Meals, Cathy Hua MD, 20 mEq at 06/11/17 0805  •  sodium chloride 0.9 % flush 1-10 mL, 1-10 mL, Intravenous, PRN, Gay Saldivar, APRN  •  technetium sestamibi  (CARDIOLITE) injection 1 dose, 1 dose, Intravenous, Once in imaging, Cathy Hua MD     STRESS TEST NO ISCHEMIA    CARDIAC CATH WNL  ASSESSMENT:  1.  Chest pain with known coronary artery disease, ATYPICAL  2.  Hypertension.  3.  Hyperlipidemia.  4.  Hypothyroidism.WITH HIGH TSH  5.  Gastroesophageal reflux disease.   6.  Degenerative disk disease.       PLAN  CPM  STRESS TEST NEDATIVE/ CARDIAC CATH WNL  EGD WNL  ADJUST MEDS  SUPPORTIVE CARE  OK TO DISCHARGE      FABRIZIO NEVAREZ MD

## 2017-06-11 NOTE — PLAN OF CARE
Problem: Patient Care Overview (Adult)  Goal: Plan of Care Review  Outcome: Ongoing (interventions implemented as appropriate)    06/11/17 1233   Coping/Psychosocial Response Interventions   Plan Of Care Reviewed With patient   Outcome Evaluation   Outcome Summary/Follow up Plan VSS. Probable discharge today.       Goal: Adult Individualization and Mutuality  Outcome: Ongoing (interventions implemented as appropriate)  Goal: Discharge Needs Assessment  Outcome: Ongoing (interventions implemented as appropriate)

## 2017-06-11 NOTE — DISCHARGE SUMMARY
[facility]  Physician Discharge Summary    Patient Identification:  Name: Leslie Barber  Age: 71 y.o.  Sex: female  :  1945  MRN: [id number]    Admit date: 2017    Discharge date and time:  2017      Admitting Physician: Cathy Hua MD     Discharge Physician: Dr. Evangelista Solis M.D.    Discharge Diagnoses: Atypical chest pain.  Cardiolite stress test negative, no ischemia; underwent cardiac catheterization was normal.  EGD normal.  Adjustment in medicines.    Patient Active Problem List   Diagnosis   • Abnormal thallium stress test   • Benign essential hypertension   • Chronic coronary artery disease   • Chest pain   • Degeneration of intervertebral disc of lumbar region   • Pain in extremity   • Low back pain   • Mitral valve insufficiency   • Advance directive discussed with patient   • Atrophic vaginitis   • Chronic back pain   • Malignant neoplastic disease   • Coagulation disorder   • Detrusor instability of bladder   • Stress incontinence in female   • Hyperlipidemia   • Hypertension   • Hypothyroidism   • Arthropathy of lumbar facet joint   • Muscular atrophy   • Complication of surgical procedure   • History of heart valve repair   • Spondylolisthesis   • Tricuspid valve insufficiency   • Ulcer of heel   • Urinary urgency   • Enterocele   • Near syncope   • Spinal stenosis of cervical region   • Arthritis   • Unstable angina   • Hypotension     -Shortness of breath, improved  -Chest pain-underwent invasive cardiac catheterization, was normal, no ischemia. No MI on this admission by serial troponin and EKG Criteria. Past Cath 2014 =>50% RCA.  -History of mitral valve repair   -Acute anemia with hx of PUD: Anemia:  EGD relatively unrevealing with regards to potential cause of chest pain.   Await biopsies  Continue once/day PPI    Even though FOBT is negative, we did discuss inpt colonoscopy for evaluation of her ALTAGRACIA. Pt would like to defer this and consider  outpt evaluation.  -Hypertension, controlled  -Hyperlipidemia. Takes statin  -Hypothyroidism: TSH 21.17  -SMOKING COUNSELING cessation  - HTN-diastolic pressures running in the 90s on Lopressor and twice a day Apresoline.  Changed Apresoline to 3 times a day.  - Dyslipidemia-TC, LDL not at goal.  Chged to lipitor. Repeat LFTs and lipid panel recommended in 8-12 weeks         Discharged Condition: good    Hospital Course: This is a 71-year-old white female with a status post aortic while replacement and complaining of increasing shortness of breath and chest tightness off-and-on over the several months with weakness and tiredness but no syncopal near-syncopal episode. Patient also has history of tricuspid valve insufficiency as well as benign essential arterial hypertension.  To be functioning well recommendation monitor EKGs and cardiac enzymes and diagnostic heart catheter versus stress test was discussed.  Her blood pressure was somewhat hot.  Reviewed her medicines.  Tricuspid valve insufficiency recommendation to continue treatment medically, may need a repair. Is normal, patient continues to complains of shortness of breath, significantly low hemoglobin will have the GI consultation as well as pulmonary consultation.  Subsequently, underwent Cardiolite stress shows findings consistent with an abnormal ECG stress test criteria.  Left ventricular ejection fraction is normal, intact lady EF 50%.  Bicarbonate perfusion imaging indicates a medium sized infarct located in the lateral wall with no significant ischemia noted.  Impressions are consistent with indeterminate risk study.  Subsequently, underwent invasive cardiac catheterization for definite diagnosis test.  Cardiac cath on 6/9/2017 conclusion: Successful right and left coronary angiography.  Normal coronary arteries.  No LV gram was done because of the prosthetic aortic valve      Consultation pulmonary, Timothy Tripp MD, [facility], [facility],   thought on 6/9: Chest pain seems to be atypical. Shortness of breath is mild to moderate but comes and goes. Currently on room air and appears comfortable. No wheezing or rhonchi on auscultation. No need for bronchodilator therapy. Encourage incentive spirometer use. 2-D echo result is pending. EGD ordered for further evaluation of her acute anemia and history of peptic ulcer disease.      Consult  hospitalist, by FABRIZIO NEVAREZ MD, was seen for follow her medical problems.   Medicines were reviewed.  Recommendation keep on stress ulcer and DVT prophylaxis during hospital stay.  Monitor laboratory studies. Hypothyroidism- with elevated TSH changed thyroid supplement, Synthroid 112 mcg to 150 mcg PO daily      Consult gastroenterologist, seen for anemia, MDs: Alfred Jules, Alfred Brumfield; thought near discharge: 1. Non-cardiac chest pain  2. Iron deficiency anemia with negative FOBT   Recommendations:  Thought EGD relatively unrevealing with regards to potential cause of chest pain.   Await biopsies  Continue once/day PPI    Even though FOBT is negative, we did discuss inpt colonoscopy for evaluation of her ALTAGRACIA. Pt would like to defer this and consider outpt evaluation. I will start oral iron supplement and have pt f/u in office with Dr. Jules to discuss eventual lower endoscopic evaluation. OK for DC from GI standpoint.     At near discharge, is asking and wanting to go when ready because feels fine.  Denies any exertional chest pain or chest discomfort.  No orthopnea or PND.  No complaint of shortness of breath on exertion.  No palpitations, dizziness.  No swelling.  Taking by mouth.  Taking medicines.  Vital signs stable.  Ambulating in nursing area/desk area with family.  Telemetry sinus    Laboratory studies: Initial Potassium 4.2, glucose 116, . Hemoglobin 10.9. The rest of the CBC is normal. Chest x-ray showed no active disease. EKG showed sinus rhythm, LVH, nonspecific changes.       Consults:  "  IP CONSULT TO INTERNAL MEDICINE  IP CONSULT TO NUTRITION SERVICES  IP CONSULT TO GASTROENTEROLOGY  IP CONSULT TO PULMONOLOGY    Discharge Exam:    Vitals: /92  Pulse 80  Temp 97.8 °F (36.6 °C) (Oral)   Resp 16  Ht 63\" (160 cm)  Wt 150 lb 3.2 oz (68.1 kg)  SpO2 95%  BMI 26.61 kg/m2    General: Awake, Alert, No acute distress. A&O x 3.    Skin: Warm and dry, No obvious rash  Eyes:  No xanthelasma, normal conjunctiva, pupils equal.  Nose:  Septum midline  Mouth:  No cyanosis, normal gums and palate.  Neck:  JVP 8 cm, no whiting a wave, normal thyroid, no adenopathy  Carotids  Upstroke normal without bruit or delay.  Lungs:  Clear.  No rub.  No accessory muscle usage.  Heart::  No murmur rub or gallop.Normal S1S2  NL PMI.  No thrills or lifts.  Abdomen:  Nontender, no HSM, normal BS, no bruits.  Extremities:  No edema.  No cyanosis or clubbing.  Normal pulses.  Right radial approach: Small ecchymosis, nontender.  Overall has no complaints.  Radial pulse easily palpable and equal bilateral 2+.  Nonpulsatile.  No hematoma, no bleeding, soft, flat, A bed refill less than 2 seconds.  Denies numbness tingling.  Denies any difficulty mobility.  Lab Results (last 24 hours)     ** No results found for the last 24 hours. **            Disposition:home      Discharge Medications:     Changed:  Current Discharge Medication List            Continue:  Current Discharge Medication List      CONTINUE these medications which have NOT CHANGED    Details   acetaminophen-codeine (TYLENOL #3) 300-30 MG per tablet Take 1 tablet by mouth Every 6 (Six) Hours As Needed for Moderate Pain (4-6).      aspirin 81 MG tablet Take 81 mg by mouth Daily.      cetirizine (ZyrTEC) 5 MG tablet Take 5 mg by mouth 2 (Two) Times a Day.      cyclobenzaprine (FLEXERIL) 10 MG tablet Take 10 mg by mouth 3 (Three) Times a Day As Needed for Muscle Spasms.      estradiol (ESTRACE) 1 MG tablet TAKE ONE TABLET BY MOUTH EVERY DAY  Qty: 90 tablet, " Refills: 0      hydrALAZINE (APRESOLINE) 50 MG tablet Take 50 mg by mouth 2 (Two) Times a Day.      levothyroxine (SYNTHROID, LEVOTHROID) 112 MCG tablet Take 112 mcg by mouth Daily.      meloxicam (MOBIC) 15 MG tablet Take 15 mg by mouth Daily.      metoprolol tartrate (LOPRESSOR) 25 MG tablet Take 25 mg by mouth Daily.      NON FORMULARY Daily. Tumeric      oxybutynin XL (DITROPAN-XL) 10 MG 24 hr tablet Take 10 mg by mouth Daily.      pantoprazole (PROTONIX) 40 MG EC tablet Take 40 mg by mouth Daily.      potassium chloride (KLOR-CON) 20 MEQ packet Take 20 mEq by mouth 2 (Two) Times a Day.      simvastatin (ZOCOR) 20 MG tablet Take 20 mg by mouth Every Night.      Solifenacin Succinate (VESICARE PO) Take 5 mg by mouth Daily.              Start:  Current Discharge Medication List          Stop:  Current Discharge Medication List             Discharge Home Instructions:  -Follow up with Dr. Cathy Hahn to be seen within one to 2 weeks.  Call our office tomorrow, on Monday, 6/12/17 to schedule this appointment.  Call sooner if has any questions or concerns.    -Follow-up with PCP, call tomorrow, on Monday, 6/12/17 to be seen in one week.  Call sooner if has any questions or concerns.    -     Signed:  MODESTO Pérez  6/11/2017  1:44 PM    Total time spent discharging patient including evaluation,post hospitalization follow up, medication and post hospitalization instructions and education total time exceeds 40 minutes.    I agree with above note.  I personally carried out physical exam, reviewed all pertinent clinical data, discussed assessment with APRN, then determined optimal plan.    I have also discussed the assessment and plan with family.    Cardiac catheterization was normal on June 9, 2017.  Ejection fraction was normal.  There is no further cardiac workup of chest pain and shortness of breath at this point.  Next    Evangelista Solis M.D.  June 11, 2017    EMR Dragon/Transcription disclaimer:    Much of this encounter note is an electronic transcription/translation of spoken language to printed text. The electronic translation of spoken language may permit erroneous, or at times, nonsensical words or phrases to be inadvertently transcribed; Although I have reviewed the note for such errors, some may still exist.

## 2017-06-12 LAB
LAB AP CASE REPORT: NORMAL
Lab: NORMAL
PATH REPORT.FINAL DX SPEC: NORMAL
PATH REPORT.GROSS SPEC: NORMAL

## 2017-06-12 NOTE — H&P
Subjective   Leslie Barber is a 71 y.o. female who here for follow up     CC  CP OFF AND ON FOR COUPLE MONTHS  HPI  71-year-old white female with a status post aortic while replacement here for the follow-up as the patient has been complaining of increasing shortness of breath and chest tightness off-and-on over the several months with weakness and tiredness but no syncopal near-syncopal episode     Patient also has history of tricuspid valve insufficiency as well as benign essential arterial hypertension           Problem List Items Addressed This Visit               Cardiovascular and Mediastinum     Hypertension     Relevant Medications     metoprolol tartrate (LOPRESSOR) 25 MG tablet     Tricuspid valve insufficiency     Relevant Medications     metoprolol tartrate (LOPRESSOR) 25 MG tablet     Unstable angina     Relevant Medications     metoprolol tartrate (LOPRESSOR) 25 MG tablet          Other     History of heart valve repair - Primary          .     The following portions of the patient's history were reviewed and updated as appropriate: allergies, current medications, past family history, past medical history, past social history, past surgical history and problem list.      Medical History         Past Medical History:   Diagnosis Date   • Cataract     • Chest pain     • Coronary artery disease       CHRONIC   • GERD (gastroesophageal reflux disease)     • Hyperlipidemia     • Hypertension     • Mitral valve disease     • Myocardial infarction     • Near syncope     • Pain in extremity         reports that she has never smoked. She has never used smokeless tobacco. She reports that she does not drink alcohol or use illicit drugs.        Family History   Problem Relation Age of Onset   • Hypertension Mother     • Heart disease Mother     • Hypertension Father     • Heart disease Father           Review of Systems  Constitutional: No wt loss, fever, fatigue  Gastrointestinal: No nausea, abdominal  "pain  Behavioral/Psych: No insomnia or anxiety  Cardiovascular Shortness of breath and chest tightness  Objective:          Objective   Physical Exam                Objective   Physical Exam  /88  Pulse 90  Ht 63\" (160 cm)  Wt 152 lb (68.9 kg)  BMI 26.93 kg/m2    General appearance: NAD, conversant   Eyes: anicteric sclerae, moist conjunctivae; no lid-lag; PERRLA   HENT: Atraumatic; oropharynx clear with moist mucous membranes and no mucosal ulcerations;  normal hard and soft palate   Neck: Trachea midline; FROM, supple, no thyromegaly or lymphadenopathy   Lungs: CTA, with normal respiratory effort and no intercostal retractions   CV: S1-S2 regular, sys murmurs, no rub, no gallop   Abdomen: Soft, non-tender; no masses or HSM   Extremities: No peripheral edema or extremity lymphadenopathy  Skin: Normal temperature, turgor and texture; no rash, ulcers or subcutaneous nodules   Psych: Appropriate affect, alert and oriented to person, place and time             Cardiographics  @  ECG 12 Lead  Date/Time: 6/7/2017 11:02 AM  Performed by: LEANDER HOUSTON  Authorized by: LEANDER HOUSTON   Comparison: compared with previous ECG   Similar to previous ECG  Rhythm: sinus rhythm  ST Flattening: all  Clinical impression: non-specific ECG      IMPRESSION OF HEART CATHETERIZATION:    1. Normal left main coronary artery.  2. Normal left anterior descending artery and its branches.  3. Normal left circumflex artery and its branches.  4. Normal right coronary artery.  5. Normal left ventricular systolic function.  LVEDP 25 mmHg.  Ejection   fraction 50%  6. Moderate pulmonary hypertension.  7. 3+ mitral regurgitation     Echocardiogram:         Current Outpatient Prescriptions:   • aspirin 81 MG tablet, Take 81 mg by mouth Daily., Disp: , Rfl:   • cetirizine (ZyrTEC) 5 MG tablet, Take by mouth., Disp: , Rfl:   • cyclobenzaprine (FLEXERIL) 10 MG tablet, Take 10 mg by mouth 3 (Three) Times a Day As Needed for " Muscle Spasms., Disp: , Rfl:   • estradiol (ESTRACE) 1 MG tablet, TAKE ONE TABLET BY MOUTH EVERY DAY, Disp: 90 tablet, Rfl: 0  • hydrALAZINE (APRESOLINE) 50 MG tablet, Take 50 mg by mouth 3 (Three) Times a Day., Disp: , Rfl:   • levothyroxine (SYNTHROID, LEVOTHROID) 112 MCG tablet, Take 112 mcg by mouth Daily., Disp: , Rfl:   • meloxicam (MOBIC) 15 MG tablet, Take 15 mg by mouth Daily., Disp: , Rfl:   • metoprolol tartrate (LOPRESSOR) 25 MG tablet, Take 25 mg by mouth 2 (Two) Times a Day., Disp: , Rfl:   • oxybutynin XL (DITROPAN-XL) 10 MG 24 hr tablet, Take 10 mg by mouth Daily., Disp: , Rfl:   • pantoprazole (PROTONIX) 40 MG EC tablet, Take 40 mg by mouth Daily., Disp: , Rfl:   • potassium chloride (KLOR-CON) 20 MEQ packet, Take 20 mEq by mouth 2 (Two) Times a Day., Disp: , Rfl:   • simvastatin (ZOCOR) 20 MG tablet, Take by mouth., Disp: , Rfl:   • Solifenacin Succinate (VESICARE PO), Take by mouth., Disp: , Rfl:   Assessment:              Patient Active Problem List   Diagnosis   • Abnormal thallium stress test   • Benign essential hypertension   • Chronic coronary artery disease   • Chest pain   • Degeneration of intervertebral disc of lumbar region   • Pain in extremity   • Low back pain   • Mitral valve insufficiency   • Advance directive discussed with patient   • Atrophic vaginitis   • Chronic back pain   • Malignant neoplastic disease   • Coagulation disorder   • Detrusor instability of bladder   • Stress incontinence in female   • Hyperlipidemia   • Hypertension   • Hypothyroidism   • Arthropathy of lumbar facet joint   • Muscular atrophy   • Complication of surgical procedure   • History of heart valve repair   • Spondylolisthesis   • Tricuspid valve insufficiency   • Ulcer of heel   • Urinary urgency   • Enterocele   • Near syncope   • Spinal stenosis of cervical region   • Arthritis                Plan:         Plan           ICD-10-CM ICD-9-CM   1. History of heart valve repair Z98.890 V45.89   2.  Unstable angina I20.0 411.1   3. Essential hypertension I10 401.9   4. Tricuspid valve insufficiency, unspecified etiology I07.1 397.0      1. History of heart valve repair  Up is to be functioning well     2. Unstable angina  Will had to admit to the hospital with a repeat EKGs cardiac enzymes and diagnostic heart catheter versus stress test     3. Essential hypertension  Somewhat high     4. Tricuspid valve insufficiency, unspecified etiology  Continue to treat medically, may need a repair            ADMIT TO [facility] was given to patient for the following topics: diagnostic results, risk factor reductions, impressions, risks and benefits of treatment options and importance of treatment compliance .             EMR Dragon used for Transcription

## 2017-06-29 ENCOUNTER — TELEPHONE (OUTPATIENT)
Dept: GASTROENTEROLOGY | Facility: CLINIC | Age: 72
End: 2017-06-29

## 2017-06-29 NOTE — TELEPHONE ENCOUNTER
----- Message from Alfred Brumfield MD sent at 6/12/2017  1:00 PM EDT -----  Essentially normal EGD bx

## 2017-08-29 RX ORDER — SIMVASTATIN 20 MG
TABLET ORAL
Qty: 90 TABLET | Refills: 0 | Status: SHIPPED | OUTPATIENT
Start: 2017-08-29 | End: 2017-12-09 | Stop reason: SDUPTHER

## 2017-09-01 RX ORDER — SIMVASTATIN 20 MG
TABLET ORAL
Qty: 90 TABLET | Refills: 2 | OUTPATIENT
Start: 2017-09-01

## 2017-11-29 RX ORDER — SIMVASTATIN 20 MG
TABLET ORAL
Qty: 90 TABLET | Refills: 0 | OUTPATIENT
Start: 2017-11-29

## 2017-12-01 RX ORDER — METOPROLOL SUCCINATE 25 MG/1
TABLET, EXTENDED RELEASE ORAL
Qty: 90 TABLET | Refills: 1 | OUTPATIENT
Start: 2017-12-01

## 2017-12-11 RX ORDER — SIMVASTATIN 20 MG
TABLET ORAL
Qty: 90 TABLET | Refills: 0 | Status: SHIPPED | OUTPATIENT
Start: 2017-12-11 | End: 2018-03-09 | Stop reason: SDUPTHER

## 2018-03-12 RX ORDER — SIMVASTATIN 20 MG
TABLET ORAL
Qty: 90 TABLET | Refills: 0 | Status: SHIPPED | OUTPATIENT
Start: 2018-03-12

## 2018-06-18 RX ORDER — SIMVASTATIN 20 MG
TABLET ORAL
Qty: 90 TABLET | Refills: 0 | OUTPATIENT
Start: 2018-06-18

## 2018-06-29 RX ORDER — SIMVASTATIN 20 MG
TABLET ORAL
Qty: 90 TABLET | Refills: 0 | OUTPATIENT
Start: 2018-06-29

## 2018-07-11 RX ORDER — SIMVASTATIN 20 MG
TABLET ORAL
Qty: 90 TABLET | Refills: 2 | OUTPATIENT
Start: 2018-07-11

## 2021-03-15 ENCOUNTER — BULK ORDERING (OUTPATIENT)
Dept: CASE MANAGEMENT | Facility: OTHER | Age: 76
End: 2021-03-15

## 2021-03-15 DIAGNOSIS — Z23 IMMUNIZATION DUE: ICD-10-CM

## 2022-05-31 ENCOUNTER — TELEPHONE (OUTPATIENT)
Dept: CARDIOLOGY | Facility: CLINIC | Age: 77
End: 2022-05-31

## 2023-02-03 PROCEDURE — 80048 BASIC METABOLIC PNL TOTAL CA: CPT | Performed by: NURSE PRACTITIONER

## 2023-08-15 PROCEDURE — 80048 BASIC METABOLIC PNL TOTAL CA: CPT | Performed by: FAMILY MEDICINE

## 2023-08-21 PROCEDURE — 80048 BASIC METABOLIC PNL TOTAL CA: CPT | Performed by: FAMILY MEDICINE

## 2023-10-18 ENCOUNTER — LAB REQUISITION (OUTPATIENT)
Dept: LAB | Facility: HOSPITAL | Age: 78
End: 2023-10-18
Payer: MEDICARE

## 2023-10-18 DIAGNOSIS — J44.9 CHRONIC OBSTRUCTIVE PULMONARY DISEASE, UNSPECIFIED: ICD-10-CM

## 2023-10-18 DIAGNOSIS — I13.0 HYPERTENSIVE HEART AND CHRONIC KIDNEY DISEASE WITH HEART FAILURE AND STAGE 1 THROUGH STAGE 4 CHRONIC KIDNEY DISEASE, OR UNSPECIFIED CHRONIC KIDNEY DISEASE: ICD-10-CM

## 2023-10-18 DIAGNOSIS — I50.9 HEART FAILURE, UNSPECIFIED: ICD-10-CM

## 2023-10-18 LAB
ALBUMIN SERPL-MCNC: 3.6 G/DL (ref 3.5–5.2)
ALBUMIN/GLOB SERPL: 0.9 G/DL
ALP SERPL-CCNC: 284 U/L (ref 39–117)
ALT SERPL W P-5'-P-CCNC: 12 U/L (ref 1–33)
ANION GAP SERPL CALCULATED.3IONS-SCNC: 10.1 MMOL/L (ref 5–15)
AST SERPL-CCNC: 31 U/L (ref 1–32)
BASOPHILS # BLD AUTO: 0.12 10*3/MM3 (ref 0–0.2)
BASOPHILS NFR BLD AUTO: 2.2 % (ref 0–1.5)
BILIRUB SERPL-MCNC: 1.1 MG/DL (ref 0–1.2)
BUN SERPL-MCNC: 20 MG/DL (ref 8–23)
BUN/CREAT SERPL: 18.5 (ref 7–25)
CALCIUM SPEC-SCNC: 9.4 MG/DL (ref 8.6–10.5)
CHLORIDE SERPL-SCNC: 101 MMOL/L (ref 98–107)
CO2 SERPL-SCNC: 24.9 MMOL/L (ref 22–29)
CREAT SERPL-MCNC: 1.08 MG/DL (ref 0.57–1)
DEPRECATED RDW RBC AUTO: 67.9 FL (ref 37–54)
EGFRCR SERPLBLD CKD-EPI 2021: 52 ML/MIN/1.73
EOSINOPHIL # BLD AUTO: 0.2 10*3/MM3 (ref 0–0.4)
EOSINOPHIL NFR BLD AUTO: 3.6 % (ref 0.3–6.2)
ERYTHROCYTE [DISTWIDTH] IN BLOOD BY AUTOMATED COUNT: 21.6 % (ref 12.3–15.4)
FERRITIN SERPL-MCNC: 46.98 NG/ML (ref 13–150)
FOLATE SERPL-MCNC: 12.1 NG/ML (ref 4.78–24.2)
GLOBULIN UR ELPH-MCNC: 4.1 GM/DL
GLUCOSE SERPL-MCNC: 70 MG/DL (ref 65–99)
HCT VFR BLD AUTO: 39.9 % (ref 34–46.6)
HGB BLD-MCNC: 11 G/DL (ref 12–15.9)
IMM GRANULOCYTES # BLD AUTO: 0.01 10*3/MM3 (ref 0–0.05)
IMM GRANULOCYTES NFR BLD AUTO: 0.2 % (ref 0–0.5)
IRON 24H UR-MRATE: 22 MCG/DL (ref 37–145)
IRON SATN MFR SERPL: 4 % (ref 20–50)
LYMPHOCYTES # BLD AUTO: 1.11 10*3/MM3 (ref 0.7–3.1)
LYMPHOCYTES NFR BLD AUTO: 20 % (ref 19.6–45.3)
MCH RBC QN AUTO: 23.4 PG (ref 26.6–33)
MCHC RBC AUTO-ENTMCNC: 27.6 G/DL (ref 31.5–35.7)
MCV RBC AUTO: 84.9 FL (ref 79–97)
MONOCYTES # BLD AUTO: 0.77 10*3/MM3 (ref 0.1–0.9)
MONOCYTES NFR BLD AUTO: 13.8 % (ref 5–12)
NEUTROPHILS NFR BLD AUTO: 3.35 10*3/MM3 (ref 1.7–7)
NEUTROPHILS NFR BLD AUTO: 60.2 % (ref 42.7–76)
PLATELET # BLD AUTO: 259 10*3/MM3 (ref 140–450)
PMV BLD AUTO: 9 FL (ref 6–12)
POTASSIUM SERPL-SCNC: 3.9 MMOL/L (ref 3.5–5.2)
PROT SERPL-MCNC: 7.7 G/DL (ref 6–8.5)
RBC # BLD AUTO: 4.7 10*6/MM3 (ref 3.77–5.28)
SODIUM SERPL-SCNC: 136 MMOL/L (ref 136–145)
TIBC SERPL-MCNC: 596 MCG/DL (ref 298–536)
TRANSFERRIN SERPL-MCNC: 400 MG/DL (ref 200–360)
VIT B12 BLD-MCNC: >2000 PG/ML (ref 211–946)
WBC NRBC COR # BLD: 5.56 10*3/MM3 (ref 3.4–10.8)

## 2023-10-18 PROCEDURE — 83540 ASSAY OF IRON: CPT | Performed by: FAMILY MEDICINE

## 2023-10-18 PROCEDURE — 84466 ASSAY OF TRANSFERRIN: CPT | Performed by: FAMILY MEDICINE

## 2023-10-18 PROCEDURE — 85025 COMPLETE CBC W/AUTO DIFF WBC: CPT | Performed by: FAMILY MEDICINE

## 2023-10-18 PROCEDURE — 82607 VITAMIN B-12: CPT | Performed by: FAMILY MEDICINE

## 2023-10-18 PROCEDURE — 80053 COMPREHEN METABOLIC PANEL: CPT | Performed by: FAMILY MEDICINE

## 2023-10-18 PROCEDURE — 82746 ASSAY OF FOLIC ACID SERUM: CPT | Performed by: FAMILY MEDICINE

## 2023-10-18 PROCEDURE — 82728 ASSAY OF FERRITIN: CPT | Performed by: FAMILY MEDICINE

## 2023-10-26 PROCEDURE — 80048 BASIC METABOLIC PNL TOTAL CA: CPT | Performed by: NURSE PRACTITIONER

## 2023-10-30 PROCEDURE — 80048 BASIC METABOLIC PNL TOTAL CA: CPT | Performed by: NURSE PRACTITIONER

## 2024-01-02 PROCEDURE — 80048 BASIC METABOLIC PNL TOTAL CA: CPT | Performed by: NURSE PRACTITIONER

## 2024-01-05 PROCEDURE — 80048 BASIC METABOLIC PNL TOTAL CA: CPT | Performed by: NURSE PRACTITIONER

## 2024-11-05 ENCOUNTER — LAB REQUISITION (OUTPATIENT)
Dept: LAB | Facility: HOSPITAL | Age: 79
End: 2024-11-05
Payer: MEDICARE

## 2024-11-05 DIAGNOSIS — N39.0 URINARY TRACT INFECTION, SITE NOT SPECIFIED: ICD-10-CM

## 2024-11-05 DIAGNOSIS — I50.9 HEART FAILURE, UNSPECIFIED: ICD-10-CM

## 2024-11-05 DIAGNOSIS — R53.83 OTHER FATIGUE: ICD-10-CM

## 2024-11-05 LAB
BACTERIA UR QL AUTO: ABNORMAL /HPF
BILIRUB UR QL STRIP: NEGATIVE
CLARITY UR: ABNORMAL
COLOR UR: YELLOW
GLUCOSE UR STRIP-MCNC: NEGATIVE MG/DL
HGB UR QL STRIP.AUTO: ABNORMAL
KETONES UR QL STRIP: NEGATIVE
LEUKOCYTE ESTERASE UR QL STRIP.AUTO: ABNORMAL
NITRITE UR QL STRIP: NEGATIVE
PH UR STRIP.AUTO: 5.5 [PH] (ref 5–8)
PROT UR QL STRIP: ABNORMAL
RBC # UR STRIP: ABNORMAL /HPF
REF LAB TEST METHOD: ABNORMAL
SP GR UR STRIP: 1.02 (ref 1–1.03)
SQUAMOUS #/AREA URNS HPF: ABNORMAL /HPF
UROBILINOGEN UR QL STRIP: ABNORMAL
WBC # UR STRIP: ABNORMAL /HPF

## 2024-11-05 PROCEDURE — 81001 URINALYSIS AUTO W/SCOPE: CPT | Performed by: FAMILY MEDICINE

## 2024-11-05 PROCEDURE — 87186 SC STD MICRODIL/AGAR DIL: CPT | Performed by: FAMILY MEDICINE

## 2024-11-05 PROCEDURE — 87077 CULTURE AEROBIC IDENTIFY: CPT | Performed by: FAMILY MEDICINE

## 2024-11-05 PROCEDURE — 87086 URINE CULTURE/COLONY COUNT: CPT | Performed by: FAMILY MEDICINE

## 2024-11-07 LAB
BACTERIA SPEC AEROBE CULT: ABNORMAL
BACTERIA SPEC AEROBE CULT: ABNORMAL

## (undated) DEVICE — TUBING, SUCTION, 1/4" X 10', STRAIGHT: Brand: MEDLINE

## (undated) DEVICE — GW EMR FIX EXCHG J STD .035 3MM 260CM

## (undated) DEVICE — FRCP BX RADJAW4 NDL 2.8 240CM LG OG BX40

## (undated) DEVICE — KT MANIFLD CARDIAC

## (undated) DEVICE — PK CATH CARD 40

## (undated) DEVICE — BITEBLOCK OMNI BLOC

## (undated) DEVICE — CATH DIAG IMPULSE FL3.5 5F 100CM

## (undated) DEVICE — Device: Brand: DEFENDO AIR/WATER/SUCTION AND BIOPSY VALVE

## (undated) DEVICE — CANN NASL CO2 TRULINK W/O2 A/

## (undated) DEVICE — GLIDESHEATH BASIC HYDROPHILIC COATED INTRODUCER SHEATH: Brand: GLIDESHEATH

## (undated) DEVICE — CATH VENT MIV RADL PIG ST TIP 5F 110CM

## (undated) DEVICE — CATH DIAG IMPULSE FR4 5F 100CM